# Patient Record
Sex: FEMALE | Race: WHITE | NOT HISPANIC OR LATINO | Employment: UNEMPLOYED | URBAN - METROPOLITAN AREA
[De-identification: names, ages, dates, MRNs, and addresses within clinical notes are randomized per-mention and may not be internally consistent; named-entity substitution may affect disease eponyms.]

---

## 2023-06-28 DIAGNOSIS — O24.919 DIABETES IN PREGNANCY: Primary | ICD-10-CM

## 2023-07-27 ENCOUNTER — LAB VISIT (OUTPATIENT)
Dept: LAB | Facility: HOSPITAL | Age: 33
End: 2023-07-27
Attending: OBSTETRICS & GYNECOLOGY
Payer: MEDICAID

## 2023-07-27 ENCOUNTER — PROCEDURE VISIT (OUTPATIENT)
Dept: MATERNAL FETAL MEDICINE | Facility: CLINIC | Age: 33
End: 2023-07-27
Payer: MEDICAID

## 2023-07-27 ENCOUNTER — OFFICE VISIT (OUTPATIENT)
Dept: MATERNAL FETAL MEDICINE | Facility: CLINIC | Age: 33
End: 2023-07-27
Payer: MEDICAID

## 2023-07-27 VITALS
HEIGHT: 64 IN | HEART RATE: 95 BPM | WEIGHT: 185 LBS | SYSTOLIC BLOOD PRESSURE: 125 MMHG | DIASTOLIC BLOOD PRESSURE: 83 MMHG | BODY MASS INDEX: 31.58 KG/M2

## 2023-07-27 DIAGNOSIS — O24.111 PRE-EXISTING TYPE 2 DIABETES MELLITUS IN PREGNANCY IN FIRST TRIMESTER: ICD-10-CM

## 2023-07-27 DIAGNOSIS — O24.919 DIABETES IN PREGNANCY: ICD-10-CM

## 2023-07-27 DIAGNOSIS — O09.90 AT HIGH RISK FOR COMPLICATIONS OF INTRAUTERINE PREGNANCY (IUP): ICD-10-CM

## 2023-07-27 DIAGNOSIS — O24.111 PRE-EXISTING TYPE 2 DIABETES MELLITUS IN PREGNANCY IN FIRST TRIMESTER: Primary | ICD-10-CM

## 2023-07-27 DIAGNOSIS — O21.0 HYPEREMESIS AFFECTING PREGNANCY, ANTEPARTUM: ICD-10-CM

## 2023-07-27 DIAGNOSIS — O99.212 OBESITY COMPLICATING PREGNANCY IN SECOND TRIMESTER: ICD-10-CM

## 2023-07-27 LAB
EST. AVERAGE GLUCOSE BLD GHB EST-MCNC: 139.9 MG/DL
HBA1C MFR BLD: 6.5 %
TSH SERPL-ACNC: 1.1 UIU/ML (ref 0.35–4.94)

## 2023-07-27 PROCEDURE — 3008F PR BODY MASS INDEX (BMI) DOCUMENTED: ICD-10-PCS | Mod: CPTII,S$GLB,, | Performed by: OBSTETRICS & GYNECOLOGY

## 2023-07-27 PROCEDURE — 3074F SYST BP LT 130 MM HG: CPT | Mod: CPTII,S$GLB,, | Performed by: OBSTETRICS & GYNECOLOGY

## 2023-07-27 PROCEDURE — 76801 PR US, OB <14WKS, TRANSABD, SINGLE GESTATION: ICD-10-PCS | Mod: S$GLB,,, | Performed by: OBSTETRICS & GYNECOLOGY

## 2023-07-27 PROCEDURE — 83036 HEMOGLOBIN GLYCOSYLATED A1C: CPT

## 2023-07-27 PROCEDURE — 99204 PR OFFICE/OUTPT VISIT, NEW, LEVL IV, 45-59 MIN: ICD-10-PCS | Mod: TH,S$GLB,, | Performed by: OBSTETRICS & GYNECOLOGY

## 2023-07-27 PROCEDURE — 1159F PR MEDICATION LIST DOCUMENTED IN MEDICAL RECORD: ICD-10-PCS | Mod: CPTII,S$GLB,, | Performed by: OBSTETRICS & GYNECOLOGY

## 2023-07-27 PROCEDURE — 36415 COLL VENOUS BLD VENIPUNCTURE: CPT

## 2023-07-27 PROCEDURE — 3079F DIAST BP 80-89 MM HG: CPT | Mod: CPTII,S$GLB,, | Performed by: OBSTETRICS & GYNECOLOGY

## 2023-07-27 PROCEDURE — 1159F MED LIST DOCD IN RCRD: CPT | Mod: CPTII,S$GLB,, | Performed by: OBSTETRICS & GYNECOLOGY

## 2023-07-27 PROCEDURE — 84443 ASSAY THYROID STIM HORMONE: CPT

## 2023-07-27 PROCEDURE — 3079F PR MOST RECENT DIASTOLIC BLOOD PRESSURE 80-89 MM HG: ICD-10-PCS | Mod: CPTII,S$GLB,, | Performed by: OBSTETRICS & GYNECOLOGY

## 2023-07-27 PROCEDURE — 76801 OB US < 14 WKS SINGLE FETUS: CPT | Mod: S$GLB,,, | Performed by: OBSTETRICS & GYNECOLOGY

## 2023-07-27 PROCEDURE — 3074F PR MOST RECENT SYSTOLIC BLOOD PRESSURE < 130 MM HG: ICD-10-PCS | Mod: CPTII,S$GLB,, | Performed by: OBSTETRICS & GYNECOLOGY

## 2023-07-27 PROCEDURE — 3008F BODY MASS INDEX DOCD: CPT | Mod: CPTII,S$GLB,, | Performed by: OBSTETRICS & GYNECOLOGY

## 2023-07-27 PROCEDURE — 99204 OFFICE O/P NEW MOD 45 MIN: CPT | Mod: TH,S$GLB,, | Performed by: OBSTETRICS & GYNECOLOGY

## 2023-07-27 RX ORDER — PROMETHAZINE HYDROCHLORIDE 25 MG/1
25 TABLET ORAL EVERY 4 HOURS
Qty: 20 TABLET | Refills: 2 | Status: SHIPPED | OUTPATIENT
Start: 2023-07-27 | End: 2023-09-21

## 2023-07-27 RX ORDER — EMPAGLIFLOZIN 10 MG/1
10 TABLET, FILM COATED ORAL EVERY MORNING
Status: ON HOLD | COMMUNITY
Start: 2023-07-03 | End: 2023-12-26

## 2023-07-27 RX ORDER — ASCORBIC ACID, CHOLECALCIFEROL, DL-.ALPHA.-TOCOPHEROL ACETATE, THIAMINE HYDROCHLORIDE, RIBOFLAVIN, NIACINAMIDE, PYRIDOXINE HYDROCHLORIDE, FOLIC ACID, CYANOCOBALAMIN, CALCIUM CARBONATE, FERROUS FUMARATE, ZINC OXIDE, CUPRIC SULFATE 100; 400; 30; 1.6; 1.6; 20; 3.1; 1; 12; 200; 27; 10; 2 MG/1; [IU]/1; [IU]/1; MG/1; MG/1; MG/1; MG/1; MG/1; UG/1; MG/1; MG/1; MG/1; MG/1
1 TABLET, COATED ORAL
COMMUNITY
Start: 2023-05-31

## 2023-07-27 RX ORDER — METFORMIN HYDROCHLORIDE 1000 MG/1
1000 TABLET ORAL 2 TIMES DAILY
Qty: 60 TABLET | Refills: 0 | Status: SHIPPED | OUTPATIENT
Start: 2023-07-27 | End: 2023-09-07 | Stop reason: SDUPTHER

## 2023-07-27 RX ORDER — METFORMIN HYDROCHLORIDE 1000 MG/1
1000 TABLET ORAL 2 TIMES DAILY
COMMUNITY
Start: 2023-07-03 | End: 2023-07-27 | Stop reason: SDUPTHER

## 2023-07-27 RX ORDER — INSULIN HUMAN 100 [IU]/ML
12 INJECTION, SUSPENSION SUBCUTANEOUS
Qty: 10 ML | Refills: 0 | Status: SHIPPED | OUTPATIENT
Start: 2023-07-27 | End: 2023-07-31 | Stop reason: SDUPTHER

## 2023-07-27 RX ORDER — BLOOD SUGAR DIAGNOSTIC
1 STRIP MISCELLANEOUS 4 TIMES DAILY
Qty: 120 STRIP | Refills: 5 | Status: SHIPPED | OUTPATIENT
Start: 2023-07-27 | End: 2023-08-26

## 2023-07-27 RX ORDER — BLOOD SUGAR DIAGNOSTIC
1 STRIP MISCELLANEOUS 4 TIMES DAILY
COMMUNITY
Start: 2023-06-16 | End: 2023-07-27 | Stop reason: SDUPTHER

## 2023-07-27 RX ORDER — PYRIDOXINE HCL (VITAMIN B6) 25 MG
25 TABLET ORAL 3 TIMES DAILY
Qty: 90 TABLET | Refills: 1 | Status: SHIPPED | OUTPATIENT
Start: 2023-07-27

## 2023-07-27 RX ORDER — LANCETS 30 GAUGE
EACH MISCELLANEOUS
COMMUNITY
Start: 2023-06-05

## 2023-07-27 RX ORDER — PROGESTERONE 200 MG/1
200 CAPSULE ORAL
Status: ON HOLD | COMMUNITY
Start: 2023-07-06 | End: 2023-12-26

## 2023-07-27 RX ORDER — INSULIN LISPRO 100 [IU]/ML
8 INJECTION, SOLUTION INTRAVENOUS; SUBCUTANEOUS
Qty: 2.4 ML | Refills: 0 | Status: SHIPPED | OUTPATIENT
Start: 2023-07-27 | End: 2023-09-29

## 2023-07-27 RX ORDER — LANCETS 33 GAUGE
EACH MISCELLANEOUS
COMMUNITY
Start: 2023-06-05

## 2023-07-27 RX ORDER — DOXYLAMINE SUCCINATE 25 MG/1
25 TABLET ORAL 2 TIMES DAILY
Qty: 60 TABLET | Refills: 0 | Status: SHIPPED | OUTPATIENT
Start: 2023-07-27 | End: 2023-09-21

## 2023-07-27 RX ORDER — INSULIN HUMAN 100 [IU]/ML
INJECTION, SOLUTION PARENTERAL
COMMUNITY
Start: 2023-06-07

## 2023-07-27 NOTE — PROGRESS NOTES
Maternal Fetal Medicine New Consult    Subjective     Patient ID: Ingrid Titus is a 33 y.o. female  at 12w2d gestation with Estimated Date of Delivery: 24  who is here for consultation by M.    Chief Complaint: MFM CONSULT  (AMA/ DIABETIC (ON INSULIN)    PT DID NOT BRING HER GLUCOSE MONITORING FORM FASTING 68-96, POST BREAKFAST , POST LUNCH 110-150, AFTER SUPPER  AND AT BEDTIME .)      Pregnancy complications include:   Patient Active Problem List   Diagnosis    Pre-existing type 2 diabetes mellitus in pregnancy in first trimester    Increased BMI complicating pregnancy in second trimester    At high risk for preeclampsia complications of intrauterine pregnancy (IUP)    Hyperemesis affecting pregnancy, antepartum        HPI 33 y.o.  female  at 12w2d gestation with Estimated Date of Delivery: 24 by LMP, consistent with early US. She is sent for MFM consultation for type 2 diabetes mellitus.  She was diagnosed about 9 years ago.  She stated that the most recent hemoglobin A1c in 2023 was at 7.5%.  She is on metformin 1000 mg with breakfast everyday.  She is also on Jardiance 10 mg daily.  She she is taking regular insulin before meals and a sliding scale.  She had elevated BMI at 31.7 at initial consultation visit.  She reports no recent thyroid test.  She would history of preeclampsia in 1st pregnancy that was severe requiring delivery at 33 weeks' gestation.  She has not done a recent 24 hour urine.  She had an eye exam  She is also on  She denies any personal or family history of aneuploidy or anomalies. She denies any exposure to high fevers, viral rashes, illicit drugs or alcohol in this pregnancy.  She denies any leaking fluid, vaginal bleeding, contractions, decreased fetal movement. Denies headaches, visual disturbances, or epigastric pain    Past Medical History:   Diagnosis Date    Complication of anesthesia 2013    EPIDURAL DID NOT WORK FOR 1ST BABY     Diabetes mellitus     DURING 1ST PREGNANCY UNTIL PRESENT       Past Surgical History:   Procedure Laterality Date     SECTION      2013, AND 2016       Family History   Problem Relation Age of Onset    Hypertension Paternal Grandmother     Miscarriages / Stillbirths Maternal Grandmother     Diabetes Maternal Grandmother     Diabetes Maternal Grandfather     Hypertension Father     Diabetes Father     Stroke Mother     Miscarriages / Stillbirths Mother        Social History     Socioeconomic History    Marital status:    Tobacco Use    Smoking status: Former     Packs/day: 0.50     Years: 4.00     Pack years: 2.00     Types: Cigarettes     Quit date:      Years since quittin.5     Passive exposure: Current    Smokeless tobacco: Never   Substance and Sexual Activity    Alcohol use: Not Currently    Drug use: Never    Sexual activity: Yes     Partners: Male       Current Outpatient Medications   Medication Sig Dispense Refill    HUMULIN R REGULAR U-100 INSULN 100 unit/mL injection CHECK BLOOD SUGARS BEFORE MEALS AND AT BEDTIME: < 130 = 0 UNITS 131-180 = 4 UNITS 181-240 = 8 UNITS 241-300 = 10 UNITS 301-350 = 12 UNITS      JARDIANCE 10 mg tablet Take 10 mg by mouth every morning.      metFORMIN (GLUCOPHAGE) 1000 MG tablet Take 1,000 mg by mouth 2 (two) times daily.      ONETOUCH DELICA PLUS LANCET 33 gauge Misc SMARTSIG:device Topical Twice Daily      ONETOUCH ULTRA TEST Strp MONITOR CBG FOUR TIMES DAILY      ONETOUCH ULTRA2 METER Misc SMARTSIG:device Via Meter Twice Daily      progesterone (PROMETRIUM) 200 MG capsule Take 200 mg by mouth.       27 mg iron- 1 mg Tab Take 1 tablet by mouth.       No current facility-administered medications for this visit.       Review of patient's allergies indicates:  No Known Allergies    Medications:  Current Outpatient Medications   Medication Instructions    HUMULIN R REGULAR U-100 INSULN 100 unit/mL injection CHECK BLOOD SUGARS BEFORE MEALS AND AT  BEDTIME: < 130 = 0 UNITS 131-180 = 4 UNITS 181-240 = 8 UNITS 241-300 = 10 UNITS 301-350 = 12 UNITS    JARDIANCE 10 mg, Oral, Every morning    metFORMIN (GLUCOPHAGE) 1,000 mg, Oral, 2 times daily    ONETOUCH DELICA PLUS LANCET 33 gauge Misc SMARTSIG:device Topical Twice Daily    ONETOUCH ULTRA TEST Strp MONITOR CBG FOUR TIMES DAILY    ONETOUCH ULTRA2 METER Misc SMARTSIG:device Via Meter Twice Daily    progesterone (PROMETRIUM) 200 mg, Oral     27 mg iron- 1 mg Tab 1 tablet, Oral         Review of Systems   Constitutional:  Negative for activity change, chills, fatigue and fever.   HENT:  Negative for nasal congestion.    Eyes:  Negative for visual disturbance.   Respiratory:  Negative for cough, shortness of breath and wheezing.    Cardiovascular:  Negative for chest pain, palpitations and leg swelling.   Gastrointestinal:  Positive for nausea and vomiting. Negative for abdominal pain, constipation, diarrhea and reflux.   Endocrine: Negative for diabetes, hyperthyroidism and hypothyroidism.   Genitourinary:  Negative for bladder incontinence, frequency, hematuria, pelvic pain, urgency, vaginal bleeding, vaginal discharge and vaginal pain.   Musculoskeletal:  Negative for back pain, joint swelling and leg pain.   Integumentary:  Negative for rash.   Neurological:  Negative for seizures and headaches.   Psychiatric/Behavioral:  Negative for depression. The patient is not nervous/anxious.    All other systems reviewed and are negative.        Objective     Vitals:    07/27/23 1005   BP: 125/83   Pulse: 95       Physical Exam  Vitals and nursing note reviewed.   Constitutional:       General: She is not in acute distress.     Appearance: Normal appearance.      Comments: Increased BMI   HENT:      Head: Normocephalic and atraumatic.      Nose: Nose normal. No congestion or epistaxis.      Mouth/Throat:      Pharynx: Oropharynx is clear.   Eyes:      General: No scleral icterus.     Pupils: Pupils are equal, round,  and reactive to light.   Cardiovascular:      Rate and Rhythm: Normal rate and regular rhythm.   Pulmonary:      Effort: No respiratory distress.      Breath sounds: Normal breath sounds. No wheezing.   Abdominal:      General: Abdomen is flat.      Palpations: Abdomen is soft.      Tenderness: There is no abdominal tenderness. There is no right CVA tenderness, left CVA tenderness or guarding.      Comments: No CVA tenderness gravid uterus.    Musculoskeletal:         General: Normal range of motion.      Cervical back: Neck supple.      Right lower leg: No edema.      Left lower leg: No edema.   Skin:     General: Skin is warm.      Findings: No bruising or rash.   Neurological:      General: No focal deficit present.      Mental Status: She is oriented to person, place, and time.      Deep Tendon Reflexes: Reflexes normal.      Comments: Normal reflexes   Psychiatric:         Mood and Affect: Mood normal.         Behavior: Behavior normal.         Thought Content: Thought content normal.         Judgment: Judgment normal.          Assessment and Plan     ASSESSMENT/PLAN:     33 y.o.  female with IUP at 12w2d    Pre-existing type 2 diabetes mellitus in pregnancy in first trimester  I discussed with her risks associated with diabetes in pregnancy including higher risk for polyhydramnios, fetal macrosomia and  metabolic complications (hypoglycemia, hyperbilirubinemia, hypocalcemia, erythema). She was advised of the association of diabetes mellitus with anomalies especially with risk of anomalies correlated with hemoglobin A1C. The higher the hemoglobin A1C, the higher the risk of anomalies. Hemoglobin A1C was ordered with 24-hour urine for creatinine clearance and protein was ordered as baseline test to check for any micro proteinuria. The need for ophthalmology consultation to rule out diabetic retinopathy was discussed and recommended ophthalmologic evaluation for that. Patient will schedule  appointment with Eye doctor when due With risk of congenital heart defect, I discussed the option of fetal echocardiogram to assess cardiac anatomy and will schedule fetal echo around 20 weeks and will schedule level 2 obstetrical ultrasound around 20 weeks. .  Advised to stop the Jardiance    I have shared with her the options of treatment including insulin treatment which is the gold standard of care for diabetes in pregnancy versus a recent use of glyburide or metformin as alternatives. Neither Glyburide nor metformin are indicated for treatment of Type 1 DM. Although, glyburide has been used for around 20 years as primary alternative to insulin, a recent meta-analyis (2015) suggested that metformin should be the alternative to insulin and not glyburide. The conclusion of the study showed a higher birth weight (100 g) associated with glyburide use compared to insulin, two fold higher risk of  hypoglycemia, and more than 2x higher risk of macrosomia associated with glyburide use. This difference is likely to be secondary to hyperinsulinism in fetus secondary to fetal exposure to glyburide. Metformin, also had lower maternal weight gain, lower birth rate and less risk of macrosomia when compared with glyburide. When compared to insulin, Metformin had lower maternal weight gain, increased  birth and lower gestational age at delivery and lower incidence of gestational hypertension. There was a trend for less  hypoglycemia and high failure rate of 30-35%, when compared to insulin. In addition, the lack of long term data on glyburide and metformin use regarding safety was addressed and recommended use of Insulin for treatment, which is the gold standard, with excellent efficiency and safety, albeit more inconvenience. Questions were answered.    I recommend 2200 calorie ADA diet during pregnancy. It is recommended that she have 30 grams of carbohydrates with breakfast, and 60 grams with lunch and  dinner. In addition, she should have three snacks in between meals with 15 grams of carbohydrates and at bedtime with 30 grams of carbohydrates.    Log reviewed. After counseling on Insulin use, side effects and administration, the patient agreed  agree to start outpatient treatment with insulin  12  units of NPH and  8 units of humalog in am, 8 units of Humalog before supper and 8  units of NPH at bedtime. and agreed to continue metformin at 1000 mg with breakfast and. With insulin use/metformin, there is risk for hypoglycemia. I discussed symptoms of hypoglycemia with recommendation to assess blood sugar then eat something high in sugar. I informed her that the best way to decrease episodes of low blood sugar is well balanced healthy diabetic diet with appropriate snacks between meals. Brochures given. Questions answered.    She was advised to keep her blood sugars checks and to call me weekly with her blood sugars. The need for strict blood sugar control with goals of treatment to keep pre-prandial blood sugars between 65 and 90 and 1 hr postprandial blood sugars less than 140 was discussed.    Use asa as discussed.    We will plan to recheck in 2 weeks and call blood sugars next.    She needs to do fetal kick counts throughout the pregnancy starting at 24 weeks.      Increased BMI complicating pregnancy in second trimester  I discussed the risk of miscarriage in first trimester, recurrent miscarriages, congenital anomalies, hypertension, diabetes,  labor and the higher risk of  section and the higher risk of fetal demise in-utero. There is also higher risk of for excessive fetal weight and large for gestational age (LGA) fetuses. Mothers with LGA fetuses are at higher risk of prolonged labor,  delivery, shoulder dystocia and birth trauma. LGA neonates are increased risk of fetal hypoxia and intrauterine death, and are at risk to develop diabetes, obesity, metabolic syndrome, asthma and  cancer later in life. She was advised of the importance of eating healthy and limiting weight gain to 11-20 lbs during the pregnancy, as optimal in this situation. I recommended low calorie, low fat diet avoiding any additional excessive weight gain. Excess weight gain would be associated with gestational hypertension, gestational diabetes and adverse  outcomes, including fetal demise in utero.      It is important to do FKC from 24 weeks till delivery.       Importance of working on losing weight after the pregnancy is over, especially before a future pregnancy was discussed. Breastfeeding may be an important tool in reducing the postpartum weight retention. Fetal risks were discussed with short term risk of fetal/ obesity and long term risk of adolescent component of metabolic syndrome.    follow a healthy low caloric diet and diabetic diet.    At high risk for preeclampsia complications of intrauterine pregnancy (IUP)  With her risk factors for preeclampsia including preeclampsia/GHTN in a previous pregnancy and diabetes (T1DM or T2DM) , BMI over 30, low socioeconomic status, patient born with low birthweight or SGA and previous low birthweight or SGA baby, discussed recommendations for low dose aspirin use to decrease risks for adverse outcomes, including preeclampsia, low birth rate and  delivery. Low-dose aspirin reduced the risk for preeclampsia by 24% in clinical trials and reduced the risk for  birth by 14% and FGR by 20%.  After discussing risks/benefits of its use, it was agreed to start asa 81 mg BID, due to multiple risk factors for preeclampsia. After discussing benefits (more effective than daily) vs potential risks (less data on safety with use at delivery), she agreed to start low dose aspirin twice daily and continue until 34 6/7weeks, then decrease to once daily until delivery.. Also, recommend using in all future pregnancies.      Hyperemesis affecting pregnancy,  antepartum  Discussed with her that nausea and vomiting affects about 50% of pregnancies, and additional 25% have nausea only. The severe form of the condition, hyperemesis gravidarum with significant weight loss, electrolyte abnormalities affect only about 0.3-1% of pregnancies. She does not appear to be dehydrated. I advised her to take vitamin B6 25 mg three times a day, in addition to Doxylamine 25mg at bed time and 12.5 mg in am and 12.5 mg in afternoon. Phenergan 25 mg every six hours prn orally. She was advised on a low-fat bland diet, and eat frequent small meals, avoid juices, and avoid fluids with the meals.  If symptoms worsen, patient was advised to go to the emergency room or labor and delivery.      Follow up in about 2 weeks (around 8/10/2023) for MFM follow-up.             Components of this note were documented using voice recognition systems; and are subject to errors not corrected at proof reading.  Please contact the author for any clarifications.

## 2023-07-27 NOTE — ASSESSMENT & PLAN NOTE
I discussed the risk of miscarriage in first trimester, recurrent miscarriages, congenital anomalies, hypertension, diabetes,  labor and the higher risk of  section and the higher risk of fetal demise in-utero. There is also higher risk of for excessive fetal weight and large for gestational age (LGA) fetuses. Mothers with LGA fetuses are at higher risk of prolonged labor,  delivery, shoulder dystocia and birth trauma. LGA neonates are increased risk of fetal hypoxia and intrauterine death, and are at risk to develop diabetes, obesity, metabolic syndrome, asthma and cancer later in life. She was advised of the importance of eating healthy and limiting weight gain to 11-20 lbs during the pregnancy, as optimal in this situation. I recommended low calorie, low fat diet avoiding any additional excessive weight gain. Excess weight gain would be associated with gestational hypertension, gestational diabetes and adverse  outcomes, including fetal demise in utero.      It is important to do FKC from 24 weeks till delivery.       Importance of working on losing weight after the pregnancy is over, especially before a future pregnancy was discussed. Breastfeeding may be an important tool in reducing the postpartum weight retention. Fetal risks were discussed with short term risk of fetal/ obesity and long term risk of adolescent component of metabolic syndrome.    follow a healthy low caloric diet and diabetic diet.

## 2023-07-27 NOTE — ASSESSMENT & PLAN NOTE
Discussed with her that nausea and vomiting affects about 50% of pregnancies, and additional 25% have nausea only. The severe form of the condition, hyperemesis gravidarum with significant weight loss, electrolyte abnormalities affect only about 0.3-1% of pregnancies. She does not appear to be dehydrated. I advised her to take vitamin B6 25 mg three times a day, in addition to Doxylamine 25mg at bed time and 12.5 mg in am and 12.5 mg in afternoon. Phenergan 25 mg every six hours prn orally. She was advised on a low-fat bland diet, and eat frequent small meals, avoid juices, and avoid fluids with the meals.  If symptoms worsen, patient was advised to go to the emergency room or labor and delivery.

## 2023-07-27 NOTE — ASSESSMENT & PLAN NOTE
I discussed with her risks associated with diabetes in pregnancy including higher risk for polyhydramnios, fetal macrosomia and  metabolic complications (hypoglycemia, hyperbilirubinemia, hypocalcemia, erythema). She was advised of the association of diabetes mellitus with anomalies especially with risk of anomalies correlated with hemoglobin A1C. The higher the hemoglobin A1C, the higher the risk of anomalies. Hemoglobin A1C was ordered with 24-hour urine for creatinine clearance and protein was ordered as baseline test to check for any micro proteinuria. The need for ophthalmology consultation to rule out diabetic retinopathy was discussed and recommended ophthalmologic evaluation for that. Patient will schedule appointment with Eye doctor when due With risk of congenital heart defect, I discussed the option of fetal echocardiogram to assess cardiac anatomy and will schedule fetal echo around 20 weeks and will schedule level 2 obstetrical ultrasound around 20 weeks. .  Advised to stop the Jardiance    I have shared with her the options of treatment including insulin treatment which is the gold standard of care for diabetes in pregnancy versus a recent use of glyburide or metformin as alternatives. Neither Glyburide nor metformin are indicated for treatment of Type 1 DM. Although, glyburide has been used for around 20 years as primary alternative to insulin, a recent meta-analyis (2015) suggested that metformin should be the alternative to insulin and not glyburide. The conclusion of the study showed a higher birth weight (100 g) associated with glyburide use compared to insulin, two fold higher risk of  hypoglycemia, and more than 2x higher risk of macrosomia associated with glyburide use. This difference is likely to be secondary to hyperinsulinism in fetus secondary to fetal exposure to glyburide. Metformin, also had lower maternal weight gain, lower birth rate and less risk of macrosomia when  compared with glyburide. When compared to insulin, Metformin had lower maternal weight gain, increased  birth and lower gestational age at delivery and lower incidence of gestational hypertension. There was a trend for less  hypoglycemia and high failure rate of 30-35%, when compared to insulin. In addition, the lack of long term data on glyburide and metformin use regarding safety was addressed and recommended use of Insulin for treatment, which is the gold standard, with excellent efficiency and safety, albeit more inconvenience. Questions were answered.    I recommend 2200 calorie ADA diet during pregnancy. It is recommended that she have 30 grams of carbohydrates with breakfast, and 60 grams with lunch and dinner. In addition, she should have three snacks in between meals with 15 grams of carbohydrates and at bedtime with 30 grams of carbohydrates.    Log reviewed. After counseling on Insulin use, side effects and administration, the patient agreed  agree to start outpatient treatment with insulin  12  units of NPH and  8 units of humalog in am, 8 units of Humalog before supper and 8  units of NPH at bedtime. and agreed to continue metformin at 1000 mg with breakfast and. With insulin use/metformin, there is risk for hypoglycemia. I discussed symptoms of hypoglycemia with recommendation to assess blood sugar then eat something high in sugar. I informed her that the best way to decrease episodes of low blood sugar is well balanced healthy diabetic diet with appropriate snacks between meals. Brochures given. Questions answered.    She was advised to keep her blood sugars checks and to call me weekly with her blood sugars. The need for strict blood sugar control with goals of treatment to keep pre-prandial blood sugars between 65 and 90 and 1 hr postprandial blood sugars less than 140 was discussed.    Use asa as discussed.    We will plan to recheck in 2 weeks and call blood sugars next.    She  needs to do fetal kick counts throughout the pregnancy starting at 24 weeks.

## 2023-07-27 NOTE — ASSESSMENT & PLAN NOTE
With her risk factors for preeclampsia including preeclampsia/GHTN in a previous pregnancy and diabetes (T1DM or T2DM) , BMI over 30, low socioeconomic status, patient born with low birthweight or SGA and previous low birthweight or SGA baby, discussed recommendations for low dose aspirin use to decrease risks for adverse outcomes, including preeclampsia, low birth rate and  delivery. Low-dose aspirin reduced the risk for preeclampsia by 24% in clinical trials and reduced the risk for  birth by 14% and FGR by 20%.  After discussing risks/benefits of its use, it was agreed to start asa 81 mg BID, due to multiple risk factors for preeclampsia. After discussing benefits (more effective than daily) vs potential risks (less data on safety with use at delivery), she agreed to start low dose aspirin twice daily and continue until 34 6/7weeks, then decrease to once daily until delivery.. Also, recommend using in all future pregnancies.

## 2023-07-31 DIAGNOSIS — O24.111 PRE-EXISTING TYPE 2 DIABETES MELLITUS IN PREGNANCY IN FIRST TRIMESTER: ICD-10-CM

## 2023-07-31 RX ORDER — INSULIN HUMAN 100 [IU]/ML
12 INJECTION, SUSPENSION SUBCUTANEOUS
Qty: 10 ML | Refills: 0 | Status: CANCELLED | OUTPATIENT
Start: 2023-07-31 | End: 2031-10-27

## 2023-07-31 RX ORDER — INSULIN HUMAN 100 [IU]/ML
12 INJECTION, SUSPENSION SUBCUTANEOUS
Qty: 3.6 ML | Refills: 2 | Status: SHIPPED | OUTPATIENT
Start: 2023-07-31 | End: 2023-12-12 | Stop reason: SDUPTHER

## 2023-07-31 NOTE — TELEPHONE ENCOUNTER
----- Message from Ludivina Cooper sent at 7/31/2023  9:47 AM CDT -----  Regarding: Insulin  Patient would like a call back at [phone] because she was supposed to have both insulin sent into her pharmacy last week, and says her pharmacy only received one.

## 2023-08-10 ENCOUNTER — OFFICE VISIT (OUTPATIENT)
Dept: MATERNAL FETAL MEDICINE | Facility: CLINIC | Age: 33
End: 2023-08-10
Payer: MEDICAID

## 2023-08-10 VITALS
SYSTOLIC BLOOD PRESSURE: 123 MMHG | HEIGHT: 64 IN | BODY MASS INDEX: 31.76 KG/M2 | HEART RATE: 91 BPM | WEIGHT: 186 LBS | DIASTOLIC BLOOD PRESSURE: 79 MMHG

## 2023-08-10 DIAGNOSIS — O99.212 OBESITY COMPLICATING PREGNANCY IN SECOND TRIMESTER: ICD-10-CM

## 2023-08-10 DIAGNOSIS — O24.112 PRE-EXISTING TYPE 2 DIABETES MELLITUS IN PREGNANCY IN SECOND TRIMESTER: Primary | ICD-10-CM

## 2023-08-10 DIAGNOSIS — O09.90 AT HIGH RISK FOR COMPLICATIONS OF INTRAUTERINE PREGNANCY (IUP): ICD-10-CM

## 2023-08-10 PROBLEM — O21.0 HYPEREMESIS AFFECTING PREGNANCY, ANTEPARTUM: Status: RESOLVED | Noted: 2023-07-27 | Resolved: 2023-08-10

## 2023-08-10 PROCEDURE — 3008F BODY MASS INDEX DOCD: CPT | Mod: CPTII,S$GLB,, | Performed by: OBSTETRICS & GYNECOLOGY

## 2023-08-10 PROCEDURE — 3044F PR MOST RECENT HEMOGLOBIN A1C LEVEL <7.0%: ICD-10-PCS | Mod: CPTII,S$GLB,, | Performed by: OBSTETRICS & GYNECOLOGY

## 2023-08-10 PROCEDURE — 3074F PR MOST RECENT SYSTOLIC BLOOD PRESSURE < 130 MM HG: ICD-10-PCS | Mod: CPTII,S$GLB,, | Performed by: OBSTETRICS & GYNECOLOGY

## 2023-08-10 PROCEDURE — 99214 OFFICE O/P EST MOD 30 MIN: CPT | Mod: TH,S$GLB,, | Performed by: OBSTETRICS & GYNECOLOGY

## 2023-08-10 PROCEDURE — 99214 PR OFFICE/OUTPT VISIT, EST, LEVL IV, 30-39 MIN: ICD-10-PCS | Mod: TH,S$GLB,, | Performed by: OBSTETRICS & GYNECOLOGY

## 2023-08-10 PROCEDURE — 3074F SYST BP LT 130 MM HG: CPT | Mod: CPTII,S$GLB,, | Performed by: OBSTETRICS & GYNECOLOGY

## 2023-08-10 PROCEDURE — 3044F HG A1C LEVEL LT 7.0%: CPT | Mod: CPTII,S$GLB,, | Performed by: OBSTETRICS & GYNECOLOGY

## 2023-08-10 PROCEDURE — 3008F PR BODY MASS INDEX (BMI) DOCUMENTED: ICD-10-PCS | Mod: CPTII,S$GLB,, | Performed by: OBSTETRICS & GYNECOLOGY

## 2023-08-10 PROCEDURE — 1159F PR MEDICATION LIST DOCUMENTED IN MEDICAL RECORD: ICD-10-PCS | Mod: CPTII,S$GLB,, | Performed by: OBSTETRICS & GYNECOLOGY

## 2023-08-10 PROCEDURE — 3078F DIAST BP <80 MM HG: CPT | Mod: CPTII,S$GLB,, | Performed by: OBSTETRICS & GYNECOLOGY

## 2023-08-10 PROCEDURE — 1159F MED LIST DOCD IN RCRD: CPT | Mod: CPTII,S$GLB,, | Performed by: OBSTETRICS & GYNECOLOGY

## 2023-08-10 PROCEDURE — 3078F PR MOST RECENT DIASTOLIC BLOOD PRESSURE < 80 MM HG: ICD-10-PCS | Mod: CPTII,S$GLB,, | Performed by: OBSTETRICS & GYNECOLOGY

## 2023-08-10 RX ORDER — ASPIRIN 81 MG/1
81 TABLET ORAL 2 TIMES DAILY
Refills: 0 | COMMUNITY
Start: 2023-08-10 | End: 2024-05-16

## 2023-08-10 NOTE — PROGRESS NOTES
Maternal Fetal Medicine follow up consult    Subjective     Patient ID: Ingrid Titus is a 33 y.o. female  at 14w2d gestation with Estimated Date of Delivery: 24  who is here for follow  up consultation by [facility].    Chief Complaint: MFM FOLLOW UP       Pregnancy complications include:   Patient Active Problem List   Diagnosis    Pre-existing type 2 diabetes mellitus in pregnancy in second trimester    Increased BMI complicating pregnancy in second trimester    At high risk for preeclampsia complications of intrauterine pregnancy (IUP)        No changes to medical, surgical, family, social, or obstetric history.       type 2 diabetes mellitus.  She was diagnosed about 9 years ago.  She stated that the most recent hemoglobin A1c in 2023 was at 7.5%.  She is on metformin 1000 mg with breakfast and supper.  She is on insulin combination of NPH and Humalog.  She had elevated BMI at 31.7 at initial consultation visit.  TSH was normal on  at 1.1 with a hemoglobin A1c down to 6.5.  Patient's nausea vomiting has stopped.  Patient is accompanied by her mother-in-law Lisa           Interval history since last M visit: None.. She denies any leaking fluid, vaginal bleeding, contractions, decreased fetal movement. Denies headaches, visual disturbances, or epigastric pain    Medications:  Current Outpatient Medications   Medication Instructions    aspirin (ECOTRIN) 81 mg, Oral, 2 times daily    blood sugar diagnostic Strp 1 strip, Misc.(Non-Drug; Combo Route), 4 times daily    HumuLIN N NPH U-100 Insulin 12 Units, Subcutaneous, Before breakfast, Inject 12 units in the am and 8 units at night.    HUMULIN R REGULAR U-100 INSULN 100 unit/mL injection CHECK BLOOD SUGARS BEFORE MEALS AND AT BEDTIME: < 130 = 0 UNITS 131-180 = 4 UNITS 181-240 = 8 UNITS 241-300 = 10 UNITS 301-350 = 12 UNITS    insulin lispro (HUMALOG U-100 INSULIN) 8 Units, Subcutaneous, Before breakfast    insulin syringes, disposable,  1 mL Syrg 1 Syringe, Misc.(Non-Drug; Combo Route), 3 times daily    JARDIANCE 10 mg, Oral, Every morning    metFORMIN (GLUCOPHAGE) 1,000 mg, Oral, 2 times daily    ONETOUCH DELICA PLUS LANCET 33 gauge Misc SMARTSIG:device Topical Twice Daily    ONETOUCH ULTRA TEST Strp 1 strip, Misc.(Non-Drug; Combo Route), 4 times daily    ONETOUCH ULTRA2 METER Misc SMARTSIG:device Via Meter Twice Daily    progesterone (PROMETRIUM) 200 mg, Oral    promethazine (PHENERGAN) 25 mg, Oral, Every 4 hours    pyridoxine (vitamin B6) (B-6) 25 mg, Oral, 3 times daily     27 mg iron- 1 mg Tab 1 tablet, Oral    UNISOM (DOXYLAMINE) 25 mg, Oral, 2 times daily       Review of Systems   Constitutional:  Negative for activity change.   Eyes:  Negative for visual disturbance.   Respiratory: Negative.     Cardiovascular:  Negative for leg swelling.   Gastrointestinal:  Negative for abdominal pain, constipation, diarrhea, nausea, vomiting and reflux.   Genitourinary:  Negative for bladder incontinence, frequency, pelvic pain, vaginal bleeding and vaginal discharge.        Good fetal movements   Musculoskeletal:  Negative for back pain.   Neurological:  Negative for headaches.   All other systems reviewed and are negative.          Objective     Vitals:    08/10/23 0949   BP: 123/79   Pulse: 91        Physical Exam       Assessment and Plan     ASSESSMENT/PLAN:     33 y.o.  female with IUP at 14w2d       Pre-existing type 2 diabetes mellitus in pregnancy in first trimester      Follow Risks associated with diabetes in pregnancy include higher risk for polyhydramnios, fetal macrosomia and  metabolic complications (hypoglycemia, hyperbilirubinemia, hypocalcemia, erythema).     Continue 2200 calorie ADA diet during pregnancy. It is recommended that she have 30 grams of carbohydrates with breakfast, and 60 grams with lunch and dinner. In addition, she should have three snacks in between meals with 15 grams of carbohydrates and at  bedtime with 30 grams of carbohydrates.    Log reviewed. Patient advised to continue metformin 1000 mg twice a day and adjust insulin to 14 units of NPH in the morning and 8 units of Humalog in the morning 10 units of Humalog for supper and 8 units of NPH at bedtime.  Also added corrective dose of Humalog for 1 unit for every 8 mg of sugar over 140.    She was advised to keep her blood sugars checks and to call me weekly with her blood sugars. The need for strict blood sugar control with goals of treatment to keep pre-prandial blood sugars between 65 and 90 and 1 hr postprandial blood sugars less than 140 was reviewed.     With her multiple risk factors for preeclampsia, she agreed to continue asa 81 mg BID until 34 6/7 weeks, then decrease to once daily until delivery..    Will plan to check fetal anatomy around 20 weeks. Fetal testing  could be started in this setting around 30 weeks gestation .She needs to do fetal kick counts throughout the pregnancy starting at 24 weeks.      Increased BMI complicating pregnancy in second trimester    With Body mass index is 31.93 kg/m²., and stable weight from last visit, she was advised to follow a healthy low caloric diet and diabetic diet.  Excess weight gain would be associated with gestational hypertension, gestational diabetes and adverse  outcomes, including fetal demise in utero.    With risk factors associated with increased BMI,  will start fetal kick counts at 24 weeks and continue till delivery, and continue asa 81 mg BID until 34 6/7 weeks, then decrease to once daily until delivery.    It is important to lose weight after the pregnancy is over, especially before a future pregnancy was discussed. Breastfeeding may be an important tool in reducing the postpartum weight retention. Fetal risks were discussed with short term risk of fetal/ obesity and long term risk of adolescent component of metabolic syndrome.      At high risk for preeclampsia  complications of intrauterine pregnancy (IUP)  Continue aspirin b.i.d..  Preeclampsia prevention.  Decrease at 35 weeks to daily unless preeclampsia develops.      No follow-ups on file.             Components of this note were documented using voice recognition systems; and are subject to errors not corrected at proof reading.  Please contact the author for any clarifications.

## 2023-08-14 ENCOUNTER — PATIENT MESSAGE (OUTPATIENT)
Dept: MATERNAL FETAL MEDICINE | Facility: CLINIC | Age: 33
End: 2023-08-14
Payer: MEDICAID

## 2023-08-22 ENCOUNTER — PATIENT MESSAGE (OUTPATIENT)
Dept: MATERNAL FETAL MEDICINE | Facility: CLINIC | Age: 33
End: 2023-08-22
Payer: MEDICAID

## 2023-08-30 DIAGNOSIS — O99.212 OBESITY COMPLICATING PREGNANCY IN SECOND TRIMESTER: ICD-10-CM

## 2023-08-30 DIAGNOSIS — O24.112 PRE-EXISTING TYPE 2 DIABETES MELLITUS IN PREGNANCY IN SECOND TRIMESTER: Primary | ICD-10-CM

## 2023-09-05 ENCOUNTER — PATIENT MESSAGE (OUTPATIENT)
Dept: MATERNAL FETAL MEDICINE | Facility: CLINIC | Age: 33
End: 2023-09-05
Payer: MEDICAID

## 2023-09-07 DIAGNOSIS — O24.111 PRE-EXISTING TYPE 2 DIABETES MELLITUS IN PREGNANCY IN FIRST TRIMESTER: ICD-10-CM

## 2023-09-07 RX ORDER — METFORMIN HYDROCHLORIDE 1000 MG/1
1000 TABLET ORAL 2 TIMES DAILY
Qty: 60 TABLET | Refills: 0 | Status: SHIPPED | OUTPATIENT
Start: 2023-09-07 | End: 2023-10-19 | Stop reason: SDUPTHER

## 2023-09-18 ENCOUNTER — LAB VISIT (OUTPATIENT)
Dept: LAB | Facility: HOSPITAL | Age: 33
End: 2023-09-18
Attending: OBSTETRICS & GYNECOLOGY
Payer: MEDICAID

## 2023-09-18 DIAGNOSIS — Z34.82 PRENATAL CARE, SUBSEQUENT PREGNANCY IN SECOND TRIMESTER: Primary | ICD-10-CM

## 2023-09-18 LAB
ERYTHROCYTE [DISTWIDTH] IN BLOOD BY AUTOMATED COUNT: 12.7 % (ref 11.5–17)
GROUP & RH: NORMAL
HBV SURFACE AG SERPL QL IA: NONREACTIVE
HCT VFR BLD AUTO: 35.6 % (ref 37–47)
HCV AB SERPL QL IA: NONREACTIVE
HGB BLD-MCNC: 11.8 G/DL (ref 12–16)
HIV 1+2 AB+HIV1 P24 AG SERPL QL IA: NONREACTIVE
INDIRECT COOMBS GEL: NORMAL
MCH RBC QN AUTO: 29.4 PG (ref 27–31)
MCHC RBC AUTO-ENTMCNC: 33.1 G/DL (ref 33–36)
MCV RBC AUTO: 88.6 FL (ref 80–94)
NRBC BLD AUTO-RTO: 0 %
PLATELET # BLD AUTO: 233 X10(3)/MCL (ref 130–400)
PMV BLD AUTO: 10.9 FL (ref 7.4–10.4)
RBC # BLD AUTO: 4.02 X10(6)/MCL (ref 4.2–5.4)
SPECIMEN OUTDATE: NORMAL
T PALLIDUM AB SER QL: NONREACTIVE
TSH SERPL-ACNC: 2.82 UIU/ML (ref 0.35–4.94)
WBC # SPEC AUTO: 9.42 X10(3)/MCL (ref 4.5–11.5)

## 2023-09-18 PROCEDURE — 81222 CFTR GENE DUP/DELET VARIANTS: CPT | Mod: 59

## 2023-09-18 PROCEDURE — 86762 RUBELLA ANTIBODY: CPT

## 2023-09-18 PROCEDURE — 86803 HEPATITIS C AB TEST: CPT

## 2023-09-18 PROCEDURE — 85027 COMPLETE CBC AUTOMATED: CPT

## 2023-09-18 PROCEDURE — 81511 FTL CGEN ABNOR FOUR ANAL: CPT

## 2023-09-18 PROCEDURE — 87340 HEPATITIS B SURFACE AG IA: CPT

## 2023-09-18 PROCEDURE — 86780 TREPONEMA PALLIDUM: CPT

## 2023-09-18 PROCEDURE — 84443 ASSAY THYROID STIM HORMONE: CPT

## 2023-09-18 PROCEDURE — 87088 URINE BACTERIA CULTURE: CPT

## 2023-09-18 PROCEDURE — 87389 HIV-1 AG W/HIV-1&-2 AB AG IA: CPT

## 2023-09-18 PROCEDURE — 36415 COLL VENOUS BLD VENIPUNCTURE: CPT

## 2023-09-18 PROCEDURE — 85660 RBC SICKLE CELL TEST: CPT

## 2023-09-18 PROCEDURE — 86900 BLOOD TYPING SEROLOGIC ABO: CPT | Performed by: OBSTETRICS & GYNECOLOGY

## 2023-09-19 LAB
# FETUSES: 1
2ND TRIMESTER 4 SCREEN SERPL-IMP: NORMAL
AFP ADJ MOM SERPL: 0.89 MOM
AFP SERPL IA-MCNC: 46.9 NG/ML
AGE AT DELIVERY: NORMAL
B-HCG ADJ MOM SERPL: 1.17 MOM
CHORION TYPE: NORMAL
COLLECT DATE: NORMAL
CURRENT SMOKER: NORMAL
FET TS 21 RISK FROM MAT AGE: NORMAL
GA METHOD: NORMAL
GA US.COMPOSITE.EST: NORMAL WK,D
HCG SERPL IA-ACNC: 21.3 IU/ML
HGB S BLD QL SOLY: NEGATIVE
HX OF NTD QL: NO
HX OF NTD QL: NO
HX OF TRISOMY 21 QL: NO
IDDM PATIENT QL: NO
INHIBIN A ADJ MOM SERPL: 0.98 MOM
INHIBIN SERPL-MCNC: 151 PG/ML
IVF PREGNANCY: NO
LABORATORY COMMENT REPORT: NORMAL
M PHYSICIAN PHONE NUMBER: NORMAL
MATERNAL RISK FACTORS: NORMAL
NEURAL TUBE DEFECT RISK FETUS: NORMAL %
RECOM F/U: NORMAL
RUBV IGG SERPL IA-ACNC: 0.8
RUBV IGG SERPL QL IA: NORMAL
TEST PERFORMANCE INFO SPEC: NORMAL
TS 18 RISK FETUS: NORMAL
TS 21 RISK FETUS: NORMAL
U ESTRIOL ADJ MOM SERPL: 1.05 MOM
U ESTRIOL SERPL-MCNC: 1.95 NG/ML

## 2023-09-20 ENCOUNTER — PATIENT MESSAGE (OUTPATIENT)
Dept: MATERNAL FETAL MEDICINE | Facility: CLINIC | Age: 33
End: 2023-09-20
Payer: MEDICAID

## 2023-09-20 LAB — BACTERIA UR CULT: NORMAL

## 2023-09-21 ENCOUNTER — PROCEDURE VISIT (OUTPATIENT)
Dept: MATERNAL FETAL MEDICINE | Facility: CLINIC | Age: 33
End: 2023-09-21
Payer: MEDICAID

## 2023-09-21 ENCOUNTER — OFFICE VISIT (OUTPATIENT)
Dept: MATERNAL FETAL MEDICINE | Facility: CLINIC | Age: 33
End: 2023-09-21
Payer: MEDICAID

## 2023-09-21 VITALS
BODY MASS INDEX: 32.5 KG/M2 | HEART RATE: 106 BPM | HEIGHT: 64 IN | DIASTOLIC BLOOD PRESSURE: 76 MMHG | WEIGHT: 190.38 LBS | SYSTOLIC BLOOD PRESSURE: 115 MMHG

## 2023-09-21 DIAGNOSIS — O99.212 OBESITY COMPLICATING PREGNANCY IN SECOND TRIMESTER: ICD-10-CM

## 2023-09-21 DIAGNOSIS — O09.90 AT HIGH RISK FOR COMPLICATIONS OF INTRAUTERINE PREGNANCY (IUP): ICD-10-CM

## 2023-09-21 DIAGNOSIS — O24.112 PRE-EXISTING TYPE 2 DIABETES MELLITUS IN PREGNANCY IN SECOND TRIMESTER: Primary | ICD-10-CM

## 2023-09-21 DIAGNOSIS — O24.112 PRE-EXISTING TYPE 2 DIABETES MELLITUS IN PREGNANCY IN SECOND TRIMESTER: ICD-10-CM

## 2023-09-21 DIAGNOSIS — O09.522 SUPERVISION OF ELDERLY MULTIGRAVIDA IN SECOND TRIMESTER: ICD-10-CM

## 2023-09-21 PROCEDURE — 3078F PR MOST RECENT DIASTOLIC BLOOD PRESSURE < 80 MM HG: ICD-10-PCS | Mod: CPTII,S$GLB,, | Performed by: OBSTETRICS & GYNECOLOGY

## 2023-09-21 PROCEDURE — 3044F PR MOST RECENT HEMOGLOBIN A1C LEVEL <7.0%: ICD-10-PCS | Mod: CPTII,S$GLB,, | Performed by: OBSTETRICS & GYNECOLOGY

## 2023-09-21 PROCEDURE — 99214 PR OFFICE/OUTPT VISIT, EST, LEVL IV, 30-39 MIN: ICD-10-PCS | Mod: TH,25,S$GLB, | Performed by: OBSTETRICS & GYNECOLOGY

## 2023-09-21 PROCEDURE — 1159F MED LIST DOCD IN RCRD: CPT | Mod: CPTII,S$GLB,, | Performed by: OBSTETRICS & GYNECOLOGY

## 2023-09-21 PROCEDURE — 3008F PR BODY MASS INDEX (BMI) DOCUMENTED: ICD-10-PCS | Mod: CPTII,S$GLB,, | Performed by: OBSTETRICS & GYNECOLOGY

## 2023-09-21 PROCEDURE — 99214 OFFICE O/P EST MOD 30 MIN: CPT | Mod: TH,25,S$GLB, | Performed by: OBSTETRICS & GYNECOLOGY

## 2023-09-21 PROCEDURE — 3008F BODY MASS INDEX DOCD: CPT | Mod: CPTII,S$GLB,, | Performed by: OBSTETRICS & GYNECOLOGY

## 2023-09-21 PROCEDURE — 3078F DIAST BP <80 MM HG: CPT | Mod: CPTII,S$GLB,, | Performed by: OBSTETRICS & GYNECOLOGY

## 2023-09-21 PROCEDURE — 1159F PR MEDICATION LIST DOCUMENTED IN MEDICAL RECORD: ICD-10-PCS | Mod: CPTII,S$GLB,, | Performed by: OBSTETRICS & GYNECOLOGY

## 2023-09-21 PROCEDURE — 3044F HG A1C LEVEL LT 7.0%: CPT | Mod: CPTII,S$GLB,, | Performed by: OBSTETRICS & GYNECOLOGY

## 2023-09-21 PROCEDURE — 3074F SYST BP LT 130 MM HG: CPT | Mod: CPTII,S$GLB,, | Performed by: OBSTETRICS & GYNECOLOGY

## 2023-09-21 PROCEDURE — 3074F PR MOST RECENT SYSTOLIC BLOOD PRESSURE < 130 MM HG: ICD-10-PCS | Mod: CPTII,S$GLB,, | Performed by: OBSTETRICS & GYNECOLOGY

## 2023-09-21 PROCEDURE — 76811 PR US, OB FETAL EVAL & EXAM, TRANSABDOM,FIRST GESTATION: ICD-10-PCS | Mod: S$GLB,,, | Performed by: OBSTETRICS & GYNECOLOGY

## 2023-09-21 PROCEDURE — 76811 OB US DETAILED SNGL FETUS: CPT | Mod: S$GLB,,, | Performed by: OBSTETRICS & GYNECOLOGY

## 2023-09-21 NOTE — PROGRESS NOTES
Maternal Fetal Medicine Follow Up Consult    Subjective     Patient ID: [id number]    Chief Complaint: M FOLLOWUP W/US      HPI: Ingrid Titus is a 33 y.o. female  at 20w2d gestation with Estimated Date of Delivery: 24  who is here for follow  up consultation by [facility].  She has type 2 diabetes mellitus.  She was diagnosed about 9 years ago.  She stated that the most recent hemoglobin A1c in 2023 was at 7.5%.  She is on metformin 1000 mg with breakfast and supper.  She is on insulin combination of NPH and Humalog.  She had elevated BMI at 31.7 at initial consultation visit.  TSH was normal on  at 1.1 with a hemoglobin A1c down to 6.5%.  Patient's nausea and vomiting has resolved.  Patient has advanced maternal age and is going to be 33 by the due date.  She would a quad screen that was negative with a risk of Down syndrome at 1 in 2500.         Interval history since last M visit: None.. She denies any leaking fluid, vaginal bleeding, contractions, decreased fetal movement. Denies headaches, visual disturbances, or epigastric pain    Pregnancy complications include:   Patient Active Problem List   Diagnosis    Pre-existing type 2 diabetes mellitus in pregnancy in second trimester    Increased BMI complicating pregnancy in second trimester    At high risk for preeclampsia complications of intrauterine pregnancy (IUP)    Supervision of elderly multigravida in second trimester        No changes to medical, surgical, family, social, or obstetric history.    Medications:  Current Outpatient Medications   Medication Instructions    aspirin (ECOTRIN) 81 mg, Oral, 2 times daily    blood sugar diagnostic Strp 1 strip, Misc.(Non-Drug; Combo Route), 4 times daily    HumuLIN N NPH U-100 Insulin 12 Units, Subcutaneous, Before breakfast, Inject 12 units in the am and 8 units at night.    HUMULIN R REGULAR U-100 INSULN 100 unit/mL injection CHECK BLOOD SUGARS BEFORE MEALS AND AT BEDTIME: < 130 = 0  "UNITS 131-180 = 4 UNITS 181-240 = 8 UNITS 241-300 = 10 UNITS 301-350 = 12 UNITS    insulin lispro (HUMALOG U-100 INSULIN) 8 Units, Subcutaneous, Before breakfast    JARDIANCE 10 mg, Oral, Every morning    metFORMIN (GLUCOPHAGE) 1,000 mg, Oral, 2 times daily    ONETOUCH DELICA PLUS LANCET 33 gauge Misc SMARTSIG:device Topical Twice Daily    ONETOUCH ULTRA2 METER Misc SMARTSIG:device Via Meter Twice Daily    progesterone (PROMETRIUM) 200 mg, Oral    pyridoxine (vitamin B6) (B-6) 25 mg, Oral, 3 times daily     27 mg iron- 1 mg Tab 1 tablet, Oral       Review of Systems   12 point review of systems conducted, negative except as stated in the history of present illness. See HPI for details.      Objective     Visit Vitals  /76 (BP Location: Left arm, Patient Position: Sitting, BP Method: Large (Automatic))   Pulse 106   Ht 5' 4" (1.626 m)   Wt 86.4 kg (190 lb 6.4 oz)   LMP 2023 (Exact Date)   BMI 32.68 kg/m²        Physical Exam  Vitals and nursing note reviewed.   Constitutional:       Appearance: Normal appearance.      Comments: Increased BMI   HENT:      Head: Normocephalic and atraumatic.      Nose: Nose normal. No congestion.   Cardiovascular:      Rate and Rhythm: Normal rate.   Pulmonary:      Effort: Pulmonary effort is normal.   Skin:     Findings: No rash.   Neurological:      Mental Status: She is alert and oriented to person, place, and time.   Psychiatric:         Mood and Affect: Mood normal.         Behavior: Behavior normal.         Thought Content: Thought content normal.         Judgment: Judgment normal.           ASSESSMENT/PLAN:     33 y.o.  female with IUP at 20w2d    Pre-existing type 2 diabetes mellitus in pregnancy in 2nd trimester  Risks associated with diabetes in pregnancy include higher risk for polyhydramnios, fetal macrosomia and  metabolic complications (hypoglycemia, hyperbilirubinemia, hypocalcemia, erythema).     Continue 2200 calorie ADA diet during " pregnancy. It is recommended that she have 30 grams of carbohydrates with breakfast, and 60 grams with lunch and dinner. In addition, she should have three snacks in between meals with 15 grams of carbohydrates and at bedtime with 30 grams of carbohydrates.    Log reviewed. Patient advised to continue metformin 1000 mg BID and insulin 12 units of NPH in the morning and 8 units of Humalog in the morning 8 units of Humalog for supper and 8 units of NPH at bedtime.  Also added corrective dose of Humalog for 1 unit for every 8 mg of sugar over 140.    She was advised to keep her blood sugars checks and to call me weekly with her blood sugars. The need for strict blood sugar control with goals of treatment to keep pre-prandial blood sugars between 65 and 90 and 1 hr postprandial blood sugars less than 140 was reviewed.     Low-dose aspirin as discussed.    Will plan to recheck fetal growth in 4 weeks. Fetal testing could be started in this setting around 30 weeks gestation. She needs to do fetal kick counts throughout the pregnancy starting at 24 weeks.      With risk for congenital heart disease with type 2 diabetes, discussed option of fetal echo, which she accepted.  Referral to pediatric cardiology sent.       Increased BMI complicating pregnancy in second trimester  Body mass index is 32.68 kg/m².and mild excessive weight gain from last visit, 4 lb in 1 month.  She was reminded to follow a healthy low caloric diet and diabetic diet.  Excess weight gain would be associated with gestational hypertension, gestational diabetes and adverse  outcomes, including fetal demise in utero.    With risk factors associated with increased BMI, will start fetal kick counts at 24 weeks and continue until delivery.    It is important to lose weight after the pregnancy is over, especially before a future pregnancy was discussed. Breastfeeding may be an important tool in reducing the postpartum weight retention. Fetal risks  were discussed with short term risk of fetal/ obesity and long term risk of adolescent component of metabolic syndrome.        At high risk for preeclampsia complications of intrauterine pregnancy (IUP)  With her increased risk for preeclampsia, she agreed to continue asa 81 mg BID until 34 6/7 weeks, then decrease to once daily until delivery.. Preeclampsia precautions reviewed.    Advanced maternal age at 33 by [facility]      I discussed with her that the higher risk of aneuploidy related to advanced maternal age is caused by meiotic nondysjunction.  At this maternal  age, the risk of Down syndrome quoted at 1:  353 and the risk of any chromosomal problems quoted at 1:  178 .  she would not consider termination of pregnancy even if anomalies or aneuploidy is detected .. She was advised of the screening nature of the Level 2 ultrasound as well as the screening nature of a quad screen to check for the risk of aneuploidy with normal ultrasound and or quad screen testing that would lower the background risk of aneuploidy.        She was advised that the only diagnostic test at this time is genetic amniocentesis.  Benefits and risks of a genetic amniocentesis were discussed. Patient was offered genetic amniocentesis, after thorough counseling on benefits and risks of procedure, with very remote risk of complications and in some studies no identifiable increased risk, above background risk of spontaneous miscarriage, while some current data suggests risk up to 1 in 800 with early amniocentesis prior to 24 weeks, and remote risk of need for emergency delivery with all the complications of prematurity with amniocentesis after 24 weeks. She declined amniocentesis . She is aware of the need for  evaluation.    She was counseled on Cell free DNA understanding that it is an enhanced screening test but not a diagnostic test. It assesses risk for common aneuploidies such as Trisomy 13, 18, and 21 by evaluating  cell-free fetal DNA in maternal circulation with a false positive rate less than 0.5% for Trisomy 21 and less reliable for Trisomy 13 and Trisomy 18. She already did quad screen that was negative at 1 in 2500 and declined to have a cell free DNA.    The higher risk of anomalies associated with advanced maternal age was also addressed. The reassurance obtained by the normal ultrasound and the limitations of ultrasound in checking for anomalies was explained with the need for regular  evaluations.     I recommend start fetal kick counts at 24 weeks. .      Follow up in about 4 weeks (around 10/19/2023) for MFM follow-up, Repeat ultrasound, BPP, Call sugars weekly.     No future appointments.       JENNIFER involvement: Patient was evaluated by KARELE Farr and Dr. Mac.  Final assessment and recommendations as stated above were made by Dr. Mac.    Blood sugars are well controlled.  I told her she could cut down on the checking to 2 times a day at different times of the day with calling her sugars every week.  She has had reasonable weight gain of about 5 lb in about 2 months.  Continue healthy diet.  Her hemoglobin A1c has improved.  We will repeat 1 in about a month from now.  Order 24 hour urine and continue twice daily aspirin.  Counseled on advanced maternal age as above.    Components of this note were documented using voice recognition systems; and are subject to errors not corrected at proof reading.  Please contact the author for any clarifications.

## 2023-09-28 LAB
ANNOTATION COMMENT IMP: NORMAL
GENETIC VARIANT DETAILS BLD/T: NORMAL
GENETICIST REVIEW: NORMAL
LAB TEST METHOD: NORMAL
MOL DX INTERP BLD/T QL: NORMAL
PROVIDER SIGNING NAME: NORMAL
SPECIMEN SOURCE: NORMAL

## 2023-09-29 DIAGNOSIS — O24.111 PRE-EXISTING TYPE 2 DIABETES MELLITUS IN PREGNANCY IN FIRST TRIMESTER: ICD-10-CM

## 2023-09-29 RX ORDER — INSULIN LISPRO 100 [IU]/ML
INJECTION, SOLUTION INTRAVENOUS; SUBCUTANEOUS
Qty: 10 ML | Refills: 2 | Status: SHIPPED | OUTPATIENT
Start: 2023-09-29

## 2023-10-10 ENCOUNTER — PATIENT MESSAGE (OUTPATIENT)
Dept: MATERNAL FETAL MEDICINE | Facility: CLINIC | Age: 33
End: 2023-10-10
Payer: MEDICAID

## 2023-10-17 DIAGNOSIS — O24.112 PRE-EXISTING TYPE 2 DIABETES MELLITUS IN PREGNANCY IN SECOND TRIMESTER: Primary | ICD-10-CM

## 2023-10-18 NOTE — PROGRESS NOTES
Maternal Fetal Medicine Follow Up Consult    Subjective     Patient ID: [id number]    Chief Complaint: MFM follow up with US (Type 2 Diabetes.  Patient did not bring sugar log  Fasting 71- 82, Post breakfast 115-122, Post lunch 120-130 and Post supper 122-134.)      HPI: Ingrid Titus is a 33 y.o. female  at 24w2d gestation with Estimated Date of Delivery: 24  who is here for follow  up consultation by M.  She has type 2 diabetes mellitus.  She was diagnosed about 9 years ago.  She stated that the most recent hemoglobin A1c in 2023 was at 7.5%.  She is on metformin 1000 mg with breakfast and supper.  She is on insulin combination of NPH and Humalog.  She had elevated BMI at 31.7 at initial consultation visit.  TSH was normal on  at 1.1 and hemoglobin A1c was down to 6.5%.  Patient's nausea and vomiting has resolved.  Patient has advanced maternal age and is going to be 33 by the due date.  She had a quad screen that was negative with a risk of Down syndrome at 1 in 2500.  She complains of itching to her legs that has increased in intensity in the last few weeks.  She denies itching anywhere else.  She has tried anti-itch creams without relief.  There are no rashes.         Interval history since last MFM visit: None.. She denies any leaking fluid, vaginal bleeding, contractions, decreased fetal movement. Denies headaches, visual disturbances, or epigastric pain    Pregnancy complications include:   Patient Active Problem List   Diagnosis    Pre-existing type 2 diabetes mellitus in pregnancy in second trimester    Increased BMI complicating pregnancy in second trimester    At high risk for preeclampsia complications of intrauterine pregnancy (IUP)    Supervision of elderly multigravida in second trimester        No changes to medical, surgical, family, social, or obstetric history.    Medications:  Current Outpatient Medications   Medication Instructions    aspirin (ECOTRIN) 81 mg,  "Oral, 2 times daily    blood sugar diagnostic Strp 1 strip, Misc.(Non-Drug; Combo Route), 4 times daily    HumuLIN N NPH U-100 Insulin 12 Units, Subcutaneous, Before breakfast, Inject 12 units in the am and 8 units at night.    HUMULIN R REGULAR U-100 INSULN 100 unit/mL injection CHECK BLOOD SUGARS BEFORE MEALS AND AT BEDTIME: < 130 = 0 UNITS 131-180 = 4 UNITS 181-240 = 8 UNITS 241-300 = 10 UNITS 301-350 = 12 UNITS    insulin lispro (HUMALOG U-100 INSULIN) 100 unit/mL injection INJECT 8 UNITS IN AM AND 8 UNITS AT SUPPER    JARDIANCE 10 mg, Oral, Every morning    metFORMIN (GLUCOPHAGE) 1,000 mg, Oral, 2 times daily    ONETOUCH DELICA PLUS LANCET 33 gauge Misc SMARTSIG:device Topical Twice Daily    ONETOUCH ULTRA2 METER Misc SMARTSIG:device Via Meter Twice Daily    progesterone (PROMETRIUM) 200 mg, Oral    pyridoxine (vitamin B6) (B-6) 25 mg, Oral, 3 times daily     27 mg iron- 1 mg Tab 1 tablet, Oral       Review of Systems   12 point review of systems conducted, negative except as stated in the history of present illness. See HPI for details.      Objective     Visit Vitals  /82 (BP Location: Left arm, Patient Position: Sitting, BP Method: Medium (Automatic))   Pulse (!) 117   Ht 5' 4" (1.626 m)   Wt 88 kg (194 lb)   LMP 05/02/2023 (Exact Date)   BMI 33.30 kg/m²        Physical Exam  Vitals and nursing note reviewed.   Constitutional:       Appearance: Normal appearance.      Comments: Increased BMI   HENT:      Head: Normocephalic and atraumatic.      Nose: Nose normal. No congestion.   Cardiovascular:      Rate and Rhythm: Normal rate.   Pulmonary:      Effort: Pulmonary effort is normal.   Skin:     Findings: No rash.      Comments: No rashes, but there are excoriations to the anterior aspects of bilateral lower extremities   Neurological:      Mental Status: She is alert and oriented to person, place, and time.   Psychiatric:         Mood and Affect: Mood normal.         Behavior: Behavior normal.   "       Thought Content: Thought content normal.         Judgment: Judgment normal.           ASSESSMENT/PLAN:     33 y.o.  female with IUP at 24w2d    Pre-existing type 2 diabetes mellitus in pregnancy  There is normal fetal growth with an EFW of 637 g at the 32% and the AC at the 44% on 10/19/2023.    AFV is normal.     Risks associated with diabetes in pregnancy include higher risk for polyhydramnios, fetal macrosomia and  metabolic complications (hypoglycemia, hyperbilirubinemia, hypocalcemia, erythema).     Continue 2200 calorie ADA diet during pregnancy. It is recommended that she have 30 grams of carbohydrates with breakfast, and 60 grams with lunch and dinner. In addition, she should have three snacks in between meals with 15 grams of carbohydrates and at bedtime with 30 grams of carbohydrates.    She did not bring her sugar log.  Values discussed.  Patient reports fasting 71-82, post breakfast 115-122, post lunch 120-130, and post supper 122-134.  Patient advised to continue metformin 1000 mg BID and insulin 12 units of NPH in the morning and 8 units of Humalog in the morning 8 units of Humalog for supper and 8 units of NPH at bedtime.  Continue corrective dose of Humalog for 1 unit for every 8 mg of sugar over 140.    She was advised to keep her blood sugars checks and to send sugars weekly through the portal. The need for strict blood sugar control with goals of treatment to keep pre-prandial blood sugars between 65 and 90 and 1 hr postprandial blood sugars less than 140 was reviewed.     Low-dose aspirin as discussed.    Will plan to recheck fetal growth in 4 weeks. Fetal testing could be started in this setting around 30 weeks gestation. She needs to do fetal kick counts throughout the pregnancy.    With risk for congenital heart disease with type 2 diabetes, she has fetal echo scheduled for 2023.  Advised to keep appointment.    She has not yet done 24 hour urine.  Advised to do.   Order given for followup HgbA1C to do in about 2 weeks.       Increased BMI complicating pregnancy  Body mass index is 33.3 kg/m².  With mild excessive weight gain from last visit, 4 lb in 1 month, she was reminded to follow a healthy low caloric diet and diabetic diet.  Excess weight gain would be associated with gestational hypertension, gestational diabetes and adverse  outcomes, including fetal demise in utero.    With risk factors associated with increased BMI, will start fetal kick counts and continue until delivery.    It is important to lose weight after the pregnancy is over, especially before a future pregnancy was discussed. Breastfeeding may be an important tool in reducing the postpartum weight retention. Fetal risks were discussed with short term risk of fetal/ obesity and long term risk of adolescent component of metabolic syndrome.        At high risk for preeclampsia   With her increased risk for preeclampsia, she agreed to continue asa 81 mg BID until 34 6/7 weeks, then decrease to once daily until delivery.. Preeclampsia precautions reviewed.      Advanced maternal age  Reviewed risks with AMA, including risks for PTL, FGR, anomalies not seen, aneuploidy and stillbirth at term. She previously declined amniocentesis . She is aware of need for  evaluation. She previously declined cfDNA  and already did quad screen that was negative.    She was advised to continue fetal kick counts till delivery in view of the higher risk of fetal demise in utero closer to term with advanced maternal age.      Itching  No rashes appreciated.  Excoriations noted on anterior lower extremities.  She has no itching other than to the lower extremities.  However, with risk of cholestasis of pregnancy, orders given for bile acids and liver function tests.  If evidence of cholestasis, we will treat accordingly with ursodiol.      Follow up in about 4 weeks (around 2023) for MFM follow-up,  Repeat ultrasound, Send sugars weekly.     Future Appointments   Date Time Provider Department Center   11/16/2023  9:00 AM Shabbir Mac MD [facility]   11/16/2023  9:00 AM ROOM 3, [facility] Saleem PA-C

## 2023-10-19 ENCOUNTER — OFFICE VISIT (OUTPATIENT)
Dept: MATERNAL FETAL MEDICINE | Facility: CLINIC | Age: 33
End: 2023-10-19
Payer: MEDICAID

## 2023-10-19 ENCOUNTER — PROCEDURE VISIT (OUTPATIENT)
Dept: MATERNAL FETAL MEDICINE | Facility: CLINIC | Age: 33
End: 2023-10-19
Payer: MEDICAID

## 2023-10-19 VITALS
DIASTOLIC BLOOD PRESSURE: 82 MMHG | BODY MASS INDEX: 33.12 KG/M2 | HEART RATE: 117 BPM | SYSTOLIC BLOOD PRESSURE: 116 MMHG | HEIGHT: 64 IN | WEIGHT: 194 LBS

## 2023-10-19 DIAGNOSIS — O24.112 PRE-EXISTING TYPE 2 DIABETES MELLITUS IN PREGNANCY IN SECOND TRIMESTER: ICD-10-CM

## 2023-10-19 DIAGNOSIS — L29.9 ITCHING: ICD-10-CM

## 2023-10-19 DIAGNOSIS — O09.522 SUPERVISION OF ELDERLY MULTIGRAVIDA IN SECOND TRIMESTER: ICD-10-CM

## 2023-10-19 DIAGNOSIS — O09.90 AT HIGH RISK FOR COMPLICATIONS OF INTRAUTERINE PREGNANCY (IUP): Primary | ICD-10-CM

## 2023-10-19 DIAGNOSIS — O24.111 PRE-EXISTING TYPE 2 DIABETES MELLITUS IN PREGNANCY IN FIRST TRIMESTER: ICD-10-CM

## 2023-10-19 DIAGNOSIS — O99.212 OBESITY AFFECTING PREGNANCY IN SECOND TRIMESTER, UNSPECIFIED OBESITY TYPE: ICD-10-CM

## 2023-10-19 PROCEDURE — 3079F DIAST BP 80-89 MM HG: CPT | Mod: CPTII,S$GLB,,

## 2023-10-19 PROCEDURE — 3074F PR MOST RECENT SYSTOLIC BLOOD PRESSURE < 130 MM HG: ICD-10-PCS | Mod: CPTII,S$GLB,,

## 2023-10-19 PROCEDURE — 3008F PR BODY MASS INDEX (BMI) DOCUMENTED: ICD-10-PCS | Mod: CPTII,S$GLB,,

## 2023-10-19 PROCEDURE — 3044F HG A1C LEVEL LT 7.0%: CPT | Mod: CPTII,S$GLB,,

## 2023-10-19 PROCEDURE — 1160F PR REVIEW ALL MEDS BY PRESCRIBER/CLIN PHARMACIST DOCUMENTED: ICD-10-PCS | Mod: CPTII,S$GLB,,

## 2023-10-19 PROCEDURE — 3044F PR MOST RECENT HEMOGLOBIN A1C LEVEL <7.0%: ICD-10-PCS | Mod: CPTII,S$GLB,,

## 2023-10-19 PROCEDURE — 99214 PR OFFICE/OUTPT VISIT, EST, LEVL IV, 30-39 MIN: ICD-10-PCS | Mod: TH,S$GLB,,

## 2023-10-19 PROCEDURE — 3074F SYST BP LT 130 MM HG: CPT | Mod: CPTII,S$GLB,,

## 2023-10-19 PROCEDURE — 1159F MED LIST DOCD IN RCRD: CPT | Mod: CPTII,S$GLB,,

## 2023-10-19 PROCEDURE — 76816 US MFM PROCEDURE (VIEWPOINT): ICD-10-PCS | Mod: S$GLB,,, | Performed by: OBSTETRICS & GYNECOLOGY

## 2023-10-19 PROCEDURE — 1159F PR MEDICATION LIST DOCUMENTED IN MEDICAL RECORD: ICD-10-PCS | Mod: CPTII,S$GLB,,

## 2023-10-19 PROCEDURE — 3079F PR MOST RECENT DIASTOLIC BLOOD PRESSURE 80-89 MM HG: ICD-10-PCS | Mod: CPTII,S$GLB,,

## 2023-10-19 PROCEDURE — 1160F RVW MEDS BY RX/DR IN RCRD: CPT | Mod: CPTII,S$GLB,,

## 2023-10-19 PROCEDURE — 99214 OFFICE O/P EST MOD 30 MIN: CPT | Mod: TH,S$GLB,,

## 2023-10-19 PROCEDURE — 76816 OB US FOLLOW-UP PER FETUS: CPT | Mod: S$GLB,,, | Performed by: OBSTETRICS & GYNECOLOGY

## 2023-10-19 PROCEDURE — 3008F BODY MASS INDEX DOCD: CPT | Mod: CPTII,S$GLB,,

## 2023-10-19 RX ORDER — METFORMIN HYDROCHLORIDE 1000 MG/1
1000 TABLET ORAL 2 TIMES DAILY
Qty: 60 TABLET | Refills: 0 | Status: SHIPPED | OUTPATIENT
Start: 2023-10-19 | End: 2023-12-12 | Stop reason: SDUPTHER

## 2023-11-14 ENCOUNTER — PATIENT MESSAGE (OUTPATIENT)
Dept: MATERNAL FETAL MEDICINE | Facility: CLINIC | Age: 33
End: 2023-11-14
Payer: MEDICAID

## 2023-11-15 DIAGNOSIS — O24.113 TYPE 2 DIABETES MELLITUS AFFECTING PREGNANCY IN THIRD TRIMESTER, ANTEPARTUM: Primary | ICD-10-CM

## 2023-11-15 DIAGNOSIS — O24.112 PRE-EXISTING TYPE 2 DIABETES MELLITUS IN PREGNANCY IN SECOND TRIMESTER: ICD-10-CM

## 2023-11-15 NOTE — PROGRESS NOTES
Maternal Fetal Medicine Follow Up    Subjective     Patient ID: [id number]    Chief Complaint: high risk pregnancy followup    HPI: Ingrid Titus is a 33 y.o. female  at 28w2d gestation with Estimated Date of Delivery: 24  who is here for follow  up consultation by M.  She has type 2 diabetes mellitus, diagnosed about 9 years ago. She is on metformin 1000 mg BID as well as combination of NPH and Humalog. She had normal fetal echo on 10/25/23.  She had elevated BMI at 31.7 at initial consultation visit. TSH was normal on  at 1.1 and hemoglobin A1c was down to 6.5%.  Follow-up hemoglobin A1c on 2023 was 6.3%.  24 hour urine completed on 2023 and is pending.  Because of past history of itching patient was given an order for bile acid and liver function tests that she did this morning on 2023.  AST was normal at 19 ALT was normal at 7 and bile acid is pending.  Reports her itching is better.  She is of AMA.  She had a quad screen that was negative, and declined additional testing. She is on low dose aspirin 81mg twice daily.        Interval history since last [facility] visit: None.. She denies any leaking fluid, vaginal bleeding, contractions, decreased fetal movement. Denies headaches, visual disturbances, or epigastric pain    Pregnancy complications include:   Patient Active Problem List   Diagnosis    Pre-existing type 2 diabetes mellitus in pregnancy in third trimester    Increased BMI complicating pregnancy in third trimester    At high risk for preeclampsia complications of intrauterine pregnancy (IUP)    Supervision of elderly multigravida in third trimester    Itching        No changes to medical, surgical, family, social, or obstetric history.    Medications:  Current Outpatient Medications   Medication Instructions    aspirin (ECOTRIN) 81 mg, Oral, 2 times daily    blood sugar diagnostic Strp 1 strip, Misc.(Non-Drug; Combo Route), 4 times daily    HumuLIN N NPH U-100  "Insulin 12 Units, Subcutaneous, Before breakfast, Inject 12 units in the am and 8 units at night.    HUMULIN R REGULAR U-100 INSULN 100 unit/mL injection CHECK BLOOD SUGARS BEFORE MEALS AND AT BEDTIME: < 130 = 0 UNITS 131-180 = 4 UNITS 181-240 = 8 UNITS 241-300 = 10 UNITS 301-350 = 12 UNITS    insulin lispro (HUMALOG U-100 INSULIN) 100 unit/mL injection INJECT 8 UNITS IN AM AND 8 UNITS AT SUPPER    JARDIANCE 10 mg, Oral, Every morning    metFORMIN (GLUCOPHAGE) 1,000 mg, Oral, 2 times daily    ONETOUCH DELICA PLUS LANCET 33 gauge Misc SMARTSIG:device Topical Twice Daily    ONETOUCH ULTRA2 METER Misc SMARTSIG:device Via Meter Twice Daily    progesterone (PROMETRIUM) 200 mg, Oral    pyridoxine (vitamin B6) (B-6) 25 mg, Oral, 3 times daily     27 mg iron- 1 mg Tab 1 tablet, Oral       Review of Systems   12 point review of systems conducted, negative except as stated in the history of present illness. See HPI for details.      Objective     Visit Vitals  /78 (BP Location: Left arm, Patient Position: Sitting, BP Method: Medium (Automatic))   Pulse (!) 117   Ht 5' 4" (1.626 m)   Wt 87.1 kg (192 lb)   LMP 05/02/2023 (Exact Date)   BMI 32.96 kg/m²        Physical Exam  Vitals and nursing note reviewed.   Constitutional:       Appearance: Normal appearance.      Comments: Increased BMI   HENT:      Head: Normocephalic and atraumatic.      Nose: Nose normal. No congestion.   Cardiovascular:      Rate and Rhythm: Normal rate.   Pulmonary:      Effort: Pulmonary effort is normal.   Skin:     Findings: No rash.      Comments: No rashes, but there are excoriations to the anterior aspects of bilateral lower extremities   Neurological:      Mental Status: She is alert and oriented to person, place, and time.   Psychiatric:         Mood and Affect: Mood normal.         Behavior: Behavior normal.         Thought Content: Thought content normal.         Judgment: Judgment normal.           ASSESSMENT/PLAN:     33 y.o. "  female with IUP at 28w2d    Pre-existing type 2 diabetes mellitus in pregnancy  There is normal fetal growth with an EFW of 1293 g at the 52% and the AC at the 78% on 2023.  AFV is normal.     Risks associated with diabetes in pregnancy include higher risk for polyhydramnios, fetal macrosomia and  metabolic complications (hypoglycemia, hyperbilirubinemia, hypocalcemia, erythema).     Continue 2200 calorie ADA diet during pregnancy. It is recommended that she have 30 grams of carbohydrates with breakfast, and 60 grams with lunch and dinner. In addition, she should have three snacks in between meals with 15 grams of carbohydrates and at bedtime with 30 grams of carbohydrates.    Patient advised to continue metformin 1000 mg BID and insulin 12 units of NPH in the morning and 8 units of Humalog in the morning 8 units of Humalog for supper and 8 units of NPH at bedtime.  Continue corrective dose of Humalog for 1 unit for every 8 mg of sugar over 140.    Advised to continue to check glucose 4/day and take log to each appointment.      Low-dose aspirin as discussed.    Will plan to recheck fetal growth in 4 weeks. Recommend weekly fetal testing starting at 30 weeks, increased to twice weekly around 32 weeks, to alternate with Primary OB until delivery. Reviewed [facility] instructions.    She has not yet done 24 hour urine. Advised to do.      Increased BMI complicating pregnancy  Advised to continue to follow a healthy low caloric diet and diabetic diet.  Excess weight gain would be associated with gestational hypertension, worsening diabetes and adverse  outcomes, including fetal demise in utero.    It is important to lose weight after the pregnancy is over, especially before a future pregnancy was discussed. Breastfeeding may be an important tool in reducing the postpartum weight retention. Fetal risks were discussed with short term risk of fetal/ obesity and long term risk of adolescent  component of metabolic syndrome.      At high risk for preeclampsia   With her increased risk for preeclampsia, she agreed to continue asa 81 mg BID until 34 6/7 weeks, then decrease to once daily until delivery.. Preeclampsia precautions reviewed.      Advanced maternal age  Reviewed risks with AMA, including risks for PTL, FGR, anomalies not seen, aneuploidy and stillbirth at term. She previously declined amniocentesis . She is aware of need for  evaluation. She previously declined cfDNA  and already did quad screen that was negative.    Reviewed importance of FKC 3/day and prn with instructions to immediately report any decreased fetal movement.      Previous itching  Bile acids and LFT were done on 2023.  ALT AST are normal.  Bile acid is pending.  Her itching is improved do not suspect cholestasis.      Follow up in about 4 weeks (around 2023) for Remind to call blood sugars weekly, M follow-up, BPP.     No future appointments.    Patient was evaluated and examined by Dr. Mac. ANDREA Gandhi, helped in pre charting of part of note.      Components of this note were documented using voice recognition systems and are subject to errors not corrected at proofreading. Please contact the author for any clarifications.

## 2023-11-16 ENCOUNTER — OFFICE VISIT (OUTPATIENT)
Dept: MATERNAL FETAL MEDICINE | Facility: CLINIC | Age: 33
End: 2023-11-16
Payer: MEDICAID

## 2023-11-16 ENCOUNTER — LAB VISIT (OUTPATIENT)
Dept: LAB | Facility: HOSPITAL | Age: 33
End: 2023-11-16
Payer: MEDICAID

## 2023-11-16 ENCOUNTER — PROCEDURE VISIT (OUTPATIENT)
Dept: MATERNAL FETAL MEDICINE | Facility: CLINIC | Age: 33
End: 2023-11-16
Payer: MEDICAID

## 2023-11-16 VITALS
DIASTOLIC BLOOD PRESSURE: 78 MMHG | SYSTOLIC BLOOD PRESSURE: 119 MMHG | BODY MASS INDEX: 32.78 KG/M2 | HEIGHT: 64 IN | HEART RATE: 117 BPM | WEIGHT: 192 LBS

## 2023-11-16 DIAGNOSIS — O24.113 TYPE 2 DIABETES MELLITUS AFFECTING PREGNANCY IN THIRD TRIMESTER, ANTEPARTUM: ICD-10-CM

## 2023-11-16 DIAGNOSIS — O24.112 PRE-EXISTING TYPE 2 DIABETES MELLITUS IN PREGNANCY IN SECOND TRIMESTER: ICD-10-CM

## 2023-11-16 DIAGNOSIS — O09.523 SUPERVISION OF ELDERLY MULTIGRAVIDA IN THIRD TRIMESTER: ICD-10-CM

## 2023-11-16 DIAGNOSIS — O99.213 OBESITY AFFECTING PREGNANCY IN THIRD TRIMESTER, UNSPECIFIED OBESITY TYPE: ICD-10-CM

## 2023-11-16 DIAGNOSIS — O24.113 PRE-EXISTING TYPE 2 DIABETES MELLITUS IN PREGNANCY IN THIRD TRIMESTER: Primary | ICD-10-CM

## 2023-11-16 DIAGNOSIS — O24.113 TYPE 2 DIABETES MELLITUS AFFECTING PREGNANCY IN THIRD TRIMESTER, ANTEPARTUM: Primary | ICD-10-CM

## 2023-11-16 DIAGNOSIS — L29.9 ITCHING: ICD-10-CM

## 2023-11-16 DIAGNOSIS — O09.90 AT HIGH RISK FOR COMPLICATIONS OF INTRAUTERINE PREGNANCY (IUP): ICD-10-CM

## 2023-11-16 LAB
ALBUMIN SERPL-MCNC: 2.8 G/DL (ref 3.5–5)
ALP SERPL-CCNC: 77 UNIT/L (ref 40–150)
ALT SERPL-CCNC: 7 UNIT/L (ref 0–55)
AST SERPL-CCNC: 19 UNIT/L (ref 5–34)
BILIRUB SERPL-MCNC: 0.3 MG/DL
BILIRUBIN DIRECT+TOT PNL SERPL-MCNC: 0.1 MG/DL (ref 0–?)
BILIRUBIN DIRECT+TOT PNL SERPL-MCNC: 0.2 MG/DL (ref 0–0.8)
EST. AVERAGE GLUCOSE BLD GHB EST-MCNC: 134.1 MG/DL
HBA1C MFR BLD: 6.3 %
PATH REV: NORMAL
PROT 24H UR-MCNC: 177.1 MG/24 H (ref 0–165)
PROT SERPL-MCNC: 6.5 GM/DL (ref 6.4–8.3)
PROT UR STRIP-MCNC: 15.4 MG/DL
TOTAL VOLUME  (OHS): 1150 ML

## 2023-11-16 PROCEDURE — 1159F PR MEDICATION LIST DOCUMENTED IN MEDICAL RECORD: ICD-10-PCS | Mod: CPTII,S$GLB,, | Performed by: OBSTETRICS & GYNECOLOGY

## 2023-11-16 PROCEDURE — 3074F SYST BP LT 130 MM HG: CPT | Mod: CPTII,S$GLB,, | Performed by: OBSTETRICS & GYNECOLOGY

## 2023-11-16 PROCEDURE — 80076 HEPATIC FUNCTION PANEL: CPT

## 2023-11-16 PROCEDURE — 99213 OFFICE O/P EST LOW 20 MIN: CPT | Mod: TH,S$GLB,, | Performed by: OBSTETRICS & GYNECOLOGY

## 2023-11-16 PROCEDURE — 3044F HG A1C LEVEL LT 7.0%: CPT | Mod: CPTII,S$GLB,, | Performed by: OBSTETRICS & GYNECOLOGY

## 2023-11-16 PROCEDURE — 76816 PR  US,PREGNANT UTERUS,F/U,TRANSABD APP: ICD-10-PCS | Mod: S$GLB,,, | Performed by: OBSTETRICS & GYNECOLOGY

## 2023-11-16 PROCEDURE — 3078F PR MOST RECENT DIASTOLIC BLOOD PRESSURE < 80 MM HG: ICD-10-PCS | Mod: CPTII,S$GLB,, | Performed by: OBSTETRICS & GYNECOLOGY

## 2023-11-16 PROCEDURE — 1159F MED LIST DOCD IN RCRD: CPT | Mod: CPTII,S$GLB,, | Performed by: OBSTETRICS & GYNECOLOGY

## 2023-11-16 PROCEDURE — 3078F DIAST BP <80 MM HG: CPT | Mod: CPTII,S$GLB,, | Performed by: OBSTETRICS & GYNECOLOGY

## 2023-11-16 PROCEDURE — 84156 ASSAY OF PROTEIN URINE: CPT

## 2023-11-16 PROCEDURE — 36415 COLL VENOUS BLD VENIPUNCTURE: CPT

## 2023-11-16 PROCEDURE — 76816 OB US FOLLOW-UP PER FETUS: CPT | Mod: S$GLB,,, | Performed by: OBSTETRICS & GYNECOLOGY

## 2023-11-16 PROCEDURE — 3044F PR MOST RECENT HEMOGLOBIN A1C LEVEL <7.0%: ICD-10-PCS | Mod: CPTII,S$GLB,, | Performed by: OBSTETRICS & GYNECOLOGY

## 2023-11-16 PROCEDURE — 3008F BODY MASS INDEX DOCD: CPT | Mod: CPTII,S$GLB,, | Performed by: OBSTETRICS & GYNECOLOGY

## 2023-11-16 PROCEDURE — 3074F PR MOST RECENT SYSTOLIC BLOOD PRESSURE < 130 MM HG: ICD-10-PCS | Mod: CPTII,S$GLB,, | Performed by: OBSTETRICS & GYNECOLOGY

## 2023-11-16 PROCEDURE — 99213 PR OFFICE/OUTPT VISIT, EST, LEVL III, 20-29 MIN: ICD-10-PCS | Mod: TH,S$GLB,, | Performed by: OBSTETRICS & GYNECOLOGY

## 2023-11-16 PROCEDURE — 3008F PR BODY MASS INDEX (BMI) DOCUMENTED: ICD-10-PCS | Mod: CPTII,S$GLB,, | Performed by: OBSTETRICS & GYNECOLOGY

## 2023-11-16 PROCEDURE — 82239 BILE ACIDS TOTAL: CPT

## 2023-11-16 PROCEDURE — 83036 HEMOGLOBIN GLYCOSYLATED A1C: CPT

## 2023-11-16 RX ORDER — SYRINGE,SAFETY WITH NEEDLE,1ML 25GX1"
SYRINGE (EA) MISCELLANEOUS
Qty: 90 EACH | Refills: 3 | Status: SHIPPED | OUTPATIENT
Start: 2023-11-16

## 2023-11-21 ENCOUNTER — PATIENT MESSAGE (OUTPATIENT)
Dept: MATERNAL FETAL MEDICINE | Facility: CLINIC | Age: 33
End: 2023-11-21
Payer: MEDICAID

## 2023-11-22 ENCOUNTER — TELEPHONE (OUTPATIENT)
Dept: MATERNAL FETAL MEDICINE | Facility: CLINIC | Age: 33
End: 2023-11-22
Payer: MEDICAID

## 2023-11-22 LAB
BILE AC SERPL-SCNC: 1.21 NMOL/ML
CDCAE SERPL-SCNC: 0.41 NMOL/ML
CHOLATE SERPL-SCNC: 0.22 NMOL/ML
DO-CHOLATE SERPL-SCNC: 0.44 NMOL/ML
URSODEOXYCHOLATE SERPL-SCNC: 0.14 NMOL/ML

## 2023-11-22 NOTE — PROGRESS NOTES
Please call patient and let her know her bile acids were normal (no evidence of cholestasis) as well as her LFTs and her 24 hour urine. HgbA1C was 6.3% (stable)

## 2023-11-22 NOTE — TELEPHONE ENCOUNTER
----- Message from Emily Saleem PA-C sent at 11/22/2023  2:46 PM CST -----  Please call patient and let her know her bile acids were normal (no evidence of cholestasis) as well as her LFTs and her 24 hour urine. HgbA1C was 6.3% (stable)    Pt notified

## 2023-12-05 ENCOUNTER — HOSPITAL ENCOUNTER (EMERGENCY)
Facility: HOSPITAL | Age: 33
Discharge: HOME | End: 2023-12-05
Payer: MEDICAID

## 2023-12-05 VITALS
TEMPERATURE: 98 F | HEART RATE: 92 BPM | OXYGEN SATURATION: 98 % | SYSTOLIC BLOOD PRESSURE: 117 MMHG | DIASTOLIC BLOOD PRESSURE: 70 MMHG

## 2023-12-05 LAB
ALBUMIN SERPL-MCNC: 2.5 G/DL (ref 3.5–5)
ALBUMIN/GLOB SERPL: 0.7 RATIO (ref 1.1–2)
ALP SERPL-CCNC: 104 UNIT/L (ref 40–150)
ALT SERPL-CCNC: 5 UNIT/L (ref 0–55)
APPEARANCE UR: CLEAR
AST SERPL-CCNC: 14 UNIT/L (ref 5–34)
BACTERIA #/AREA URNS AUTO: ABNORMAL /HPF
BASOPHILS # BLD AUTO: 0.02 X10(3)/MCL
BASOPHILS NFR BLD AUTO: 0.3 %
BILIRUB SERPL-MCNC: 0.2 MG/DL
BILIRUB UR QL STRIP.AUTO: NEGATIVE
BUN SERPL-MCNC: 6.5 MG/DL (ref 7–18.7)
CALCIUM SERPL-MCNC: 8.7 MG/DL (ref 8.4–10.2)
CHLORIDE SERPL-SCNC: 109 MMOL/L (ref 98–107)
CO2 SERPL-SCNC: 20 MMOL/L (ref 22–29)
COLOR UR AUTO: ABNORMAL
CREAT SERPL-MCNC: 0.67 MG/DL (ref 0.55–1.02)
CREAT UR-MCNC: 73.8 MG/DL (ref 45–106)
EOSINOPHIL # BLD AUTO: 0.11 X10(3)/MCL (ref 0–0.9)
EOSINOPHIL NFR BLD AUTO: 1.4 %
ERYTHROCYTE [DISTWIDTH] IN BLOOD BY AUTOMATED COUNT: 12.3 % (ref 11.5–17)
GFR SERPLBLD CREATININE-BSD FMLA CKD-EPI: >60 MLS/MIN/1.73/M2
GLOBULIN SER-MCNC: 3.5 GM/DL (ref 2.4–3.5)
GLUCOSE SERPL-MCNC: 197 MG/DL (ref 74–100)
GLUCOSE UR QL STRIP.AUTO: ABNORMAL
HCT VFR BLD AUTO: 33.4 % (ref 37–47)
HGB BLD-MCNC: 11.1 G/DL (ref 12–16)
IMM GRANULOCYTES # BLD AUTO: 0.03 X10(3)/MCL (ref 0–0.04)
IMM GRANULOCYTES NFR BLD AUTO: 0.4 %
KETONES UR QL STRIP.AUTO: NEGATIVE
LDH SERPL-CCNC: 134 U/L (ref 125–220)
LEUKOCYTE ESTERASE UR QL STRIP.AUTO: NEGATIVE
LYMPHOCYTES # BLD AUTO: 2.04 X10(3)/MCL (ref 0.6–4.6)
LYMPHOCYTES NFR BLD AUTO: 26.1 %
MCH RBC QN AUTO: 28.9 PG (ref 27–31)
MCHC RBC AUTO-ENTMCNC: 33.2 G/DL (ref 33–36)
MCV RBC AUTO: 87 FL (ref 80–94)
MONOCYTES # BLD AUTO: 0.55 X10(3)/MCL (ref 0.1–1.3)
MONOCYTES NFR BLD AUTO: 7 %
MUCOUS THREADS URNS QL MICRO: ABNORMAL /LPF
NEUTROPHILS # BLD AUTO: 5.07 X10(3)/MCL (ref 2.1–9.2)
NEUTROPHILS NFR BLD AUTO: 64.8 %
NITRITE UR QL STRIP.AUTO: NEGATIVE
NRBC BLD AUTO-RTO: 0 %
PH UR STRIP.AUTO: 6 [PH]
PLATELET # BLD AUTO: 202 X10(3)/MCL (ref 130–400)
PMV BLD AUTO: 11.3 FL (ref 7.4–10.4)
POTASSIUM SERPL-SCNC: 4 MMOL/L (ref 3.5–5.1)
PROT SERPL-MCNC: 6 GM/DL (ref 6.4–8.3)
PROT UR QL STRIP.AUTO: ABNORMAL
PROT UR STRIP-MCNC: 28.1 MG/DL
RBC # BLD AUTO: 3.84 X10(6)/MCL (ref 4.2–5.4)
RBC #/AREA URNS AUTO: ABNORMAL /HPF
RBC UR QL AUTO: NEGATIVE
SODIUM SERPL-SCNC: 136 MMOL/L (ref 136–145)
SP GR UR STRIP.AUTO: 1.01 (ref 1–1.03)
SQUAMOUS #/AREA URNS LPF: ABNORMAL /HPF
URATE SERPL-MCNC: 3.4 MG/DL (ref 2.6–6)
URINE PROTEIN/CREATININE RATIO (OHS): 0.4
UROBILINOGEN UR STRIP-ACNC: NORMAL
WBC # SPEC AUTO: 7.82 X10(3)/MCL (ref 4.5–11.5)
WBC #/AREA URNS AUTO: ABNORMAL /HPF

## 2023-12-05 PROCEDURE — 85025 COMPLETE CBC W/AUTO DIFF WBC: CPT | Performed by: SPECIALIST

## 2023-12-05 PROCEDURE — 99284 EMERGENCY DEPT VISIT MOD MDM: CPT

## 2023-12-05 PROCEDURE — 80053 COMPREHEN METABOLIC PANEL: CPT | Performed by: SPECIALIST

## 2023-12-05 PROCEDURE — 81001 URINALYSIS AUTO W/SCOPE: CPT | Performed by: SPECIALIST

## 2023-12-05 PROCEDURE — 84550 ASSAY OF BLOOD/URIC ACID: CPT | Performed by: SPECIALIST

## 2023-12-05 PROCEDURE — 82570 ASSAY OF URINE CREATININE: CPT | Performed by: SPECIALIST

## 2023-12-05 PROCEDURE — 83615 LACTATE (LD) (LDH) ENZYME: CPT | Performed by: SPECIALIST

## 2023-12-06 NOTE — ED PROVIDER NOTES
[facility] NOTE     Admit Date: 2023  [facility] Physician: Kike Shahid  Primary OBGYN: Dr. Js Cote    Admit Diagnosis/Chief Complaint:       Chief Complaint   Patient presents with    Vaginal Bleeding       Hospital Course:  Ingrid Titus is a 33 y.o.  at 31w0d presents complaining of a large amount of vaginal bleeding after going to the bathroom to urinate earlier tonight.  Patient reports this was a 1 time episode and afterwards quickly resolved to dark brown discharge followed by no discharge.  She denies any pains or other complaints at this time.  She denies pelvic exam, strenuous activity, or sexual intercourse within the last 48 hours.    This IUP is complicated by type 2 diabetes requiring metformin and insulin..    Patient denies leakage of fluid, contractions, headache, vision changes, RUQ pain, dysuria, fever, and nausea/vomiting.  Fetal Movement: normal.      /74   Pulse 93   Temp 98.2 °F (36.8 °C) (Oral)   LMP 2023 (Exact Date)   SpO2 97%   Temp:  [98.2 °F (36.8 °C)] 98.2 °F (36.8 °C)  Pulse:  [86] 86  BP: (159)/(79) 159/79    General: in no respiratory distress and acyanotic  Cardiovascular: regular rate and rhythm no murmurs  Respiratory: clear to auscultation, no wheezes, rales or rhonchi, symmetric air entry  Abdominal: fundus soft, nontender 30 weeks size FHT present  Back: lumbar tenderness absent CVA tenderness none  Extremeties no redness or tenderness in the calves or thighs no edema    SSE:   SVE:  Cervix Is long thick and closed.  A whitish discharge is noted.  No evidence of any active bleeding at this time.    FHT: Category 1  TOCO:  No contractions    Medical Decision Making:  Patient is episode of bleeding appears isolated.  Blood pressures during examination are elevated.  Preeclampsia labs will be obtained and continuous monitoring of fetus and blood pressures at this time.    LABS:     Recent Results (from the past 24 hour(s))   Comprehensive  metabolic panel    Collection Time: 12/05/23  8:59 PM   Result Value Ref Range    Sodium Level 136 136 - 145 mmol/L    Potassium Level 4.0 3.5 - 5.1 mmol/L    Chloride 109 (H) 98 - 107 mmol/L    Carbon Dioxide 20 (L) 22 - 29 mmol/L    Glucose Level 197 (H) 74 - 100 mg/dL    Blood Urea Nitrogen 6.5 (L) 7.0 - 18.7 mg/dL    Creatinine 0.67 0.55 - 1.02 mg/dL    Calcium Level Total 8.7 8.4 - 10.2 mg/dL    Protein Total 6.0 (L) 6.4 - 8.3 gm/dL    Albumin Level 2.5 (L) 3.5 - 5.0 g/dL    Globulin 3.5 2.4 - 3.5 gm/dL    Albumin/Globulin Ratio 0.7 (L) 1.1 - 2.0 ratio    Bilirubin Total 0.2 <=1.5 mg/dL    Alkaline Phosphatase 104 40 - 150 unit/L    Alanine Aminotransferase 5 0 - 55 unit/L    Aspartate Aminotransferase 14 5 - 34 unit/L    eGFR >60 mls/min/1.73/m2   Uric acid    Collection Time: 12/05/23  8:59 PM   Result Value Ref Range    Uric Acid 3.4 2.6 - 6.0 mg/dL   Lactate Dehydrogenase    Collection Time: 12/05/23  8:59 PM   Result Value Ref Range    Lactate Dehydrogenase 134 125 - 220 U/L   CBC with Differential    Collection Time: 12/05/23  8:59 PM   Result Value Ref Range    WBC 7.82 4.50 - 11.50 x10(3)/mcL    RBC 3.84 (L) 4.20 - 5.40 x10(6)/mcL    Hgb 11.1 (L) 12.0 - 16.0 g/dL    Hct 33.4 (L) 37.0 - 47.0 %    MCV 87.0 80.0 - 94.0 fL    MCH 28.9 27.0 - 31.0 pg    MCHC 33.2 33.0 - 36.0 g/dL    RDW 12.3 11.5 - 17.0 %    Platelet 202 130 - 400 x10(3)/mcL    MPV 11.3 (H) 7.4 - 10.4 fL    Neut % 64.8 %    Lymph % 26.1 %    Mono % 7.0 %    Eos % 1.4 %    Basophil % 0.3 %    Lymph # 2.04 0.6 - 4.6 x10(3)/mcL    Neut # 5.07 2.1 - 9.2 x10(3)/mcL    Mono # 0.55 0.1 - 1.3 x10(3)/mcL    Eos # 0.11 0 - 0.9 x10(3)/mcL    Baso # 0.02 <=0.2 x10(3)/mcL    IG# 0.03 0 - 0.04 x10(3)/mcL    IG% 0.4 %    NRBC% 0.0 %   Urinalysis, Reflex to Urine Culture    Collection Time: 12/05/23  9:17 PM    Specimen: Urine   Result Value Ref Range    Color, UA Light-Yellow Yellow, Light-Yellow, Colorless, Straw, Dark-Yellow    Appearance, UA Clear Clear     Specific Gravity, UA 1.015 1.005 - 1.030    pH, UA 6.0 5.0 - 8.5    Protein, UA Trace (A) Negative    Glucose, UA 4+ (A) Negative, Normal    Ketones, UA Negative Negative    Blood, UA Negative Negative    Bilirubin, UA Negative Negative    Urobilinogen, UA Normal 0.2, 1.0, Normal    Nitrites, UA Negative Negative    Leukocyte Esterase, UA Negative Negative    WBC, UA 0-5 None Seen, 0-2, 3-5, 0-5 /HPF    Bacteria, UA Occasional (A) None Seen, Trace /HPF    Squamous Epithelial Cells, UA Trace None Seen /HPF    Mucous, UA Trace (A) None Seen /LPF    RBC, UA 0-5 None Seen, 0-2, 3-5, 0-5 /HPF   Protein/Creatinine Ratio, Urine    Collection Time: 23  9:17 PM   Result Value Ref Range    Urine Protein Level 28.1 mg/dL    Urine Creatinine 73.8 45.0 - 106.0 mg/dL    Urine Protein/Creatinine Ratio 0.4      [unfilled]     Imaging Results    None          ASSESMENT: Ingrid Titus is a 33 y.o.   at 31w0d with vaginal bleeding in pregnancy resolved.  Initial elevated blood pressures now resolved.  Preeclampsia labs within normal limits.    Discharge Diagnosis/Clinical Impression:  Pregnancy 31 weeks.  Spotting in pregnancy 3rd trimester.    Status:Improved/stable    Disposition:  discharged to home    Medications:       Patient Instructions:   Current Discharge Medication List        CONTINUE these medications which have NOT CHANGED    Details   aspirin (ECOTRIN) 81 MG EC tablet Take 1 tablet (81 mg total) by mouth 2 (two) times a day.  Refills: 0    Associated Diagnoses: Pre-existing type 2 diabetes mellitus in pregnancy in second trimester      blood sugar diagnostic Strp 1 strip by Misc.(Non-Drug; Combo Route) route 4 (four) times daily.  Qty: 120 strip, Refills: 0      insulin lispro (HUMALOG U-100 INSULIN) 100 unit/mL injection INJECT 8 UNITS IN AM AND 8 UNITS AT SUPPER  Qty: 10 mL, Refills: 2    Comments: Inject 8 units in a.m. and 8 units at supper.  Associated Diagnoses: Pre-existing type 2  diabetes mellitus in pregnancy in first trimester      HUMULIN R REGULAR U-100 INSULN 100 unit/mL injection CHECK BLOOD SUGARS BEFORE MEALS AND AT BEDTIME: < 130 = 0 UNITS 131-180 = 4 UNITS 181-240 = 8 UNITS 241-300 = 10 UNITS 301-350 = 12 UNITS      insulin NPH (HUMULIN N NPH U-100 INSULIN) 100 unit/mL injection Inject 12 Units into the skin before breakfast. Inject 12 units in the am and 8 units at night.  Qty: 3.6 mL, Refills: 2    Associated Diagnoses: Pre-existing type 2 diabetes mellitus in pregnancy in first trimester      insulin syringe-needle U-100 1 mL 31 gauge x 5/16 Syrg Use 1 syringe to skin three times daily  Qty: 90 each, Refills: 3    Associated Diagnoses: Type 2 diabetes mellitus affecting pregnancy in third trimester, antepartum      JARDIANCE 10 mg tablet Take 10 mg by mouth every morning.      metFORMIN (GLUCOPHAGE) 1000 MG tablet Take 1 tablet (1,000 mg total) by mouth 2 (two) times daily.  Qty: 60 tablet, Refills: 0    Associated Diagnoses: Pre-existing type 2 diabetes mellitus in pregnancy in first trimester      ONETOUCH DELICA PLUS LANCET 33 gauge Misc SMARTSIG:device Topical Twice Daily      ONETOUCH ULTRA2 METER Misc SMARTSIG:device Via Meter Twice Daily      progesterone (PROMETRIUM) 200 MG capsule Take 200 mg by mouth.      pyridoxine, vitamin B6, (B-6) 25 MG Tab Take 1 tablet (25 mg total) by mouth 3 (three) times daily.  Qty: 90 tablet, Refills: 1    Associated Diagnoses: Hyperemesis affecting pregnancy, antepartum       27 mg iron- 1 mg Tab Take 1 tablet by mouth.             - Pt was given routine pregnancy instructions including to return to triage if she had any vaginal bleeding (other than spotting for the next 48hrs), any loss of fluid like her water broke, decreased fetal movement, or contractions Q 5min lasting for 2 or more hours. Pt was also instructed to drink copious water. Patient voiced understanding of all these instructions and was subsequently discharged  home.    She will follow up with her primary OB.    No discharge procedures on file.    Kike Shahid MD  OB-GYN Hospitalist       Kike Shahid MD  12/05/23 [id number]

## 2023-12-12 ENCOUNTER — PATIENT MESSAGE (OUTPATIENT)
Dept: MATERNAL FETAL MEDICINE | Facility: CLINIC | Age: 33
End: 2023-12-12
Payer: MEDICAID

## 2023-12-12 DIAGNOSIS — O24.113 PRE-EXISTING TYPE 2 DIABETES MELLITUS IN PREGNANCY IN THIRD TRIMESTER: Primary | ICD-10-CM

## 2023-12-12 DIAGNOSIS — O24.111 PRE-EXISTING TYPE 2 DIABETES MELLITUS IN PREGNANCY IN FIRST TRIMESTER: ICD-10-CM

## 2023-12-12 RX ORDER — INSULIN HUMAN 100 [IU]/ML
INJECTION, SUSPENSION SUBCUTANEOUS
Qty: 10 ML | Refills: 2 | Status: SHIPPED | OUTPATIENT
Start: 2023-12-12

## 2023-12-12 RX ORDER — METFORMIN HYDROCHLORIDE 1000 MG/1
1000 TABLET ORAL 2 TIMES DAILY
Qty: 60 TABLET | Refills: 0 | Status: SHIPPED | OUTPATIENT
Start: 2023-12-12 | End: 2024-01-11

## 2023-12-12 RX ORDER — METFORMIN HYDROCHLORIDE 1000 MG/1
1000 TABLET ORAL 2 TIMES DAILY
Qty: 60 TABLET | Refills: 0 | Status: SHIPPED | OUTPATIENT
Start: 2023-12-12 | End: 2023-12-12

## 2023-12-14 ENCOUNTER — PROCEDURE VISIT (OUTPATIENT)
Dept: MATERNAL FETAL MEDICINE | Facility: CLINIC | Age: 33
End: 2023-12-14
Payer: MEDICAID

## 2023-12-14 ENCOUNTER — OFFICE VISIT (OUTPATIENT)
Dept: MATERNAL FETAL MEDICINE | Facility: CLINIC | Age: 33
End: 2023-12-14
Payer: MEDICAID

## 2023-12-14 VITALS
BODY MASS INDEX: 34.59 KG/M2 | DIASTOLIC BLOOD PRESSURE: 84 MMHG | SYSTOLIC BLOOD PRESSURE: 134 MMHG | HEART RATE: 101 BPM | HEIGHT: 64 IN | WEIGHT: 202.63 LBS

## 2023-12-14 DIAGNOSIS — O24.113 PRE-EXISTING TYPE 2 DIABETES MELLITUS IN PREGNANCY IN THIRD TRIMESTER: ICD-10-CM

## 2023-12-14 DIAGNOSIS — L29.9 ITCHING: Primary | ICD-10-CM

## 2023-12-14 DIAGNOSIS — O26.03 EXCESSIVE WEIGHT GAIN DURING PREGNANCY IN THIRD TRIMESTER: ICD-10-CM

## 2023-12-14 DIAGNOSIS — O99.213 OBESITY AFFECTING PREGNANCY IN THIRD TRIMESTER, UNSPECIFIED OBESITY TYPE: ICD-10-CM

## 2023-12-14 DIAGNOSIS — O09.90 AT HIGH RISK FOR COMPLICATIONS OF INTRAUTERINE PREGNANCY (IUP): ICD-10-CM

## 2023-12-14 DIAGNOSIS — O16.3 HIGH BLOOD PRESSURE AFFECTING PREGNANCY IN THIRD TRIMESTER, ANTEPARTUM: ICD-10-CM

## 2023-12-14 DIAGNOSIS — O09.523 SUPERVISION OF ELDERLY MULTIGRAVIDA IN THIRD TRIMESTER: ICD-10-CM

## 2023-12-14 PROCEDURE — 3079F PR MOST RECENT DIASTOLIC BLOOD PRESSURE 80-89 MM HG: ICD-10-PCS | Mod: CPTII,S$GLB,, | Performed by: OBSTETRICS & GYNECOLOGY

## 2023-12-14 PROCEDURE — 3008F BODY MASS INDEX DOCD: CPT | Mod: CPTII,S$GLB,, | Performed by: OBSTETRICS & GYNECOLOGY

## 2023-12-14 PROCEDURE — 3044F PR MOST RECENT HEMOGLOBIN A1C LEVEL <7.0%: ICD-10-PCS | Mod: CPTII,S$GLB,, | Performed by: OBSTETRICS & GYNECOLOGY

## 2023-12-14 PROCEDURE — 3075F PR MOST RECENT SYSTOLIC BLOOD PRESS GE 130-139MM HG: ICD-10-PCS | Mod: CPTII,S$GLB,, | Performed by: OBSTETRICS & GYNECOLOGY

## 2023-12-14 PROCEDURE — 76819 FETAL BIOPHYS PROFIL W/O NST: CPT | Mod: S$GLB,,, | Performed by: OBSTETRICS & GYNECOLOGY

## 2023-12-14 PROCEDURE — 1159F PR MEDICATION LIST DOCUMENTED IN MEDICAL RECORD: ICD-10-PCS | Mod: CPTII,S$GLB,, | Performed by: OBSTETRICS & GYNECOLOGY

## 2023-12-14 PROCEDURE — 76819 PR US, OB, FETAL BIOPHYSICAL, W/O NST: ICD-10-PCS | Mod: S$GLB,,, | Performed by: OBSTETRICS & GYNECOLOGY

## 2023-12-14 PROCEDURE — 76816 OB US FOLLOW-UP PER FETUS: CPT | Mod: S$GLB,,, | Performed by: OBSTETRICS & GYNECOLOGY

## 2023-12-14 PROCEDURE — 99214 OFFICE O/P EST MOD 30 MIN: CPT | Mod: TH,S$GLB,, | Performed by: OBSTETRICS & GYNECOLOGY

## 2023-12-14 PROCEDURE — 3008F PR BODY MASS INDEX (BMI) DOCUMENTED: ICD-10-PCS | Mod: CPTII,S$GLB,, | Performed by: OBSTETRICS & GYNECOLOGY

## 2023-12-14 PROCEDURE — 3075F SYST BP GE 130 - 139MM HG: CPT | Mod: CPTII,S$GLB,, | Performed by: OBSTETRICS & GYNECOLOGY

## 2023-12-14 PROCEDURE — 3044F HG A1C LEVEL LT 7.0%: CPT | Mod: CPTII,S$GLB,, | Performed by: OBSTETRICS & GYNECOLOGY

## 2023-12-14 PROCEDURE — 76816 PR  US,PREGNANT UTERUS,F/U,TRANSABD APP: ICD-10-PCS | Mod: S$GLB,,, | Performed by: OBSTETRICS & GYNECOLOGY

## 2023-12-14 PROCEDURE — 3079F DIAST BP 80-89 MM HG: CPT | Mod: CPTII,S$GLB,, | Performed by: OBSTETRICS & GYNECOLOGY

## 2023-12-14 PROCEDURE — 1159F MED LIST DOCD IN RCRD: CPT | Mod: CPTII,S$GLB,, | Performed by: OBSTETRICS & GYNECOLOGY

## 2023-12-14 PROCEDURE — 99214 PR OFFICE/OUTPT VISIT, EST, LEVL IV, 30-39 MIN: ICD-10-PCS | Mod: TH,S$GLB,, | Performed by: OBSTETRICS & GYNECOLOGY

## 2023-12-14 NOTE — PROGRESS NOTES
Maternal Fetal Medicine Follow Up    Subjective     Patient ID: [id number]    Chief Complaint: high risk pregnancy followup    HPI: Ingrid Titus is a 33 y.o. female  at 32w2d gestation with Estimated Date of Delivery: 24  who is here for follow  up consultation by M.  She has type 2 diabetes mellitus, diagnosed about 9 years ago. She is on metformin 1000 mg BID and insulin 12 units of NPH in the morning and 8 units of Humalog in the morning 8 units of Humalog for supper and 8 units of NPH at bedtime.  She has corrective dose of Humalog for 1 unit for every 8 mg of sugar over 140.  She had normal fetal echo on 10/25/23.  She had elevated BMI at 31.7 at initial consultation visit. TSH was normal on  at 1.1 and hemoglobin A1c was down to 6.5%.  Follow-up hemoglobin A1c on 2023 was 6.3%.  On 2023, she had 24 hour urine with 177 mg protein. Because of past history of itching patient was given an order for bile acid and liver function tests that were normal on 2023 with ALT 7, AST 19, BA 1.21. Reports her itching is better.  She is of advanced maternal age and will be 33 by the EDC.  She had a quad screen that was negative, and declined additional testing. She is on low dose aspirin 81mg twice daily.        Interval history since last [facility] visit: None.. She denies any leaking fluid, vaginal bleeding, contractions, decreased fetal movement. Denies headaches, visual disturbances, or epigastric pain    Pregnancy complications include:   Patient Active Problem List   Diagnosis    Pre-existing type 2 diabetes mellitus in pregnancy in third trimester    Increased BMI complicating pregnancy in third trimester    At high risk for preeclampsia complications of intrauterine pregnancy (IUP)    Supervision of elderly multigravida in third trimester    Itching    High blood pressure affecting pregnancy in third trimester, antepartum    Excessive weight gain during pregnancy in third  "trimester        No changes to medical, surgical, family, social, or obstetric history.    Medications:  Current Outpatient Medications   Medication Instructions    aspirin (ECOTRIN) 81 mg, Oral, 2 times daily    blood sugar diagnostic Strp 1 strip, Misc.(Non-Drug; Combo Route), 4 times daily    HUMULIN R REGULAR U-100 INSULN 100 unit/mL injection CHECK BLOOD SUGARS BEFORE MEALS AND AT BEDTIME: < 130 = 0 UNITS 131-180 = 4 UNITS 181-240 = 8 UNITS 241-300 = 10 UNITS 301-350 = 12 UNITS    insulin lispro (HUMALOG U-100 INSULIN) 100 unit/mL injection INJECT 8 UNITS IN AM AND 8 UNITS AT SUPPER    insulin NPH (HUMULIN N NPH U-100 INSULIN) 100 unit/mL injection Inject 12 units in the am and 8 units at night.    insulin syringe-needle U-100 1 mL 31 gauge x 5/16 Syrg Use 1 syringe to skin three times daily    JARDIANCE 10 mg, Oral, Every morning    metFORMIN (GLUCOPHAGE) 1,000 mg, Oral, 2 times daily    ONETOUCH DELICA PLUS LANCET 33 gauge Misc SMARTSIG:device Topical Twice Daily    ONETOUCH ULTRA2 METER Misc SMARTSIG:device Via Meter Twice Daily    progesterone (PROMETRIUM) 200 mg, Oral    pyridoxine (vitamin B6) (B-6) 25 mg, Oral, 3 times daily     27 mg iron- 1 mg Tab 1 tablet, Oral       Review of Systems   12 point review of systems conducted, negative except as stated in the history of present illness. See HPI for details.      Objective     Visit Vitals  /84 (BP Location: Right arm, Patient Position: Sitting, BP Method: Large (Automatic))   Pulse 101   Ht 5' 4" (1.626 m)   Wt 91.9 kg (202 lb 9.6 oz)   LMP 05/02/2023 (Exact Date)   BMI 34.78 kg/m²        Physical Exam  Vitals and nursing note reviewed.   Constitutional:       Appearance: Normal appearance.      Comments: Increased BMI   HENT:      Head: Normocephalic and atraumatic.      Nose: Nose normal. No congestion.   Cardiovascular:      Rate and Rhythm: Normal rate.   Pulmonary:      Effort: Pulmonary effort is normal.   Skin:     Findings: No rash. "      Comments: No rashes, but there are excoriations to the anterior aspects of bilateral lower extremities   Neurological:      Mental Status: She is alert and oriented to person, place, and time.   Psychiatric:         Mood and Affect: Mood normal.         Behavior: Behavior normal.         Thought Content: Thought content normal.         Judgment: Judgment normal.           ASSESSMENT/PLAN:     33 y.o.  female with IUP at 32w2d    Pre-existing type 2 diabetes mellitus in pregnancy  There is normal fetal growth with an EFW of 2013 g at the 32% and the AC at the 79% on 2023.  AFV is normal.  BPP .    Risks associated with diabetes in pregnancy include higher risk for polyhydramnios, fetal macrosomia and  metabolic complications (hypoglycemia, hyperbilirubinemia, hypocalcemia, erythema).     Continue 2200 calorie ADA diet during pregnancy. It is recommended that she have 30 grams of carbohydrates with breakfast, and 60 grams with lunch and dinner. In addition, she should have three snacks in between meals with 15 grams of carbohydrates and at bedtime with 30 grams of carbohydrates.    Patient advised to continue metformin 1000 mg BID and insulin 12 units of NPH in the morning and 8 units of Humalog in the morning 8 units of Humalog for supper and 8 units of NPH at bedtime.  Continue corrective dose of Humalog for 1 unit for every 8 mg of sugar over 140.    Advised to continue to check glucose 2/day alternating times and take log to each appointment.      Low-dose aspirin as discussed.    Will plan to recheck fetal growth in 4 weeks. Recommend twice weekly fetal testing, to alternate with Primary OB until delivery. Reviewed [facility] instructions.      Increased BMI complicating pregnancy  Body mass index is 34.78 kg/m². With excessive weight gain from last visit, 10 lbs in 1 month , she was advised to decrease caloric intake and was reminded of the importance of avoiding excessive weight gain.   Excess weight gain would be associated with gestational hypertension, gestational diabetes and adverse  outcomes, including fetal demise in utero.    With risk factors associated with increased BMI, she is to do fetal kick counts throughout the pregnancy (after 24 weeks).    It is important to lose weight after the pregnancy is over, especially before a future pregnancy was discussed. Breastfeeding may be an important tool in reducing the postpartum weight retention. Fetal risks were discussed with short term risk of fetal/ obesity and long term risk of adolescent component of metabolic syndrome.         At high risk for preeclampsia   With her increased risk for preeclampsia, she agreed to continue asa 81 mg BID until 34 6/7 weeks, then decrease to once daily until delivery.. Preeclampsia precautions reviewed.      Advanced maternal age  Reviewed risks with AMA, including risks for PTL, FGR, anomalies not seen, aneuploidy and stillbirth at term. She previously declined amniocentesis . She is aware of need for  evaluation. She previously declined cfDNA  and already did quad screen that was negative.    Reviewed importance of FKC 3/day and prn with instructions to immediately report any decreased fetal movement.      Previous itching  Bile acids and LFTs were normal on 2023.  Her itching is improved do not suspect cholestasis.  Expectant management.      Elevated BP reading x1 without diagnosis of hypertension  BP Readings from Last 1 Encounters:   23 134/84   She had an initial reading of 143/83.    Patient has no symptoms.  Advised her that if persistently elevated maybe significant.  However out of caution agreed to do preeclampsia labs. Patient will go to do the lab work today.  Preeclampsia warnings were given.  Patient was advised to come in immediately if she has any headaches, vision problems, epigastric pain, or decreased fetal movement at any time.      Blood sugars are  well controlled.  I did not make any change on dose of insulin.  With labile blood pressure and patient being at risk for preeclampsia, elected to do outpatient labs, along with preeclampsia warnings.  Fetal kick count instructions.  We will plan twice weekly fetal testing till delivery with multiple comorbidities.  Risks from excessive weight gain reviewed.  Itching resolved with normal bile acids.    Follow up in about 1 week (around 12/21/2023) for M follow-up, BPP.     Future Appointments   Date Time Provider Department Center   12/21/2023  8:30 AM Shabbir Mac MD [facility]   12/21/2023  8:30 AM ROOM 1 AND 2, [facility]       Patient was evaluated and examined by Dr. Mac. KARLEE Farr, helped in pre charting of part of note.      Components of this note were documented using voice recognition systems and are subject to errors not corrected at proofreading. Please contact the author for any clarifications.

## 2023-12-19 DIAGNOSIS — O24.113 PRE-EXISTING TYPE 2 DIABETES MELLITUS IN PREGNANCY IN THIRD TRIMESTER: Primary | ICD-10-CM

## 2023-12-21 ENCOUNTER — OFFICE VISIT (OUTPATIENT)
Dept: MATERNAL FETAL MEDICINE | Facility: CLINIC | Age: 33
End: 2023-12-21
Payer: MEDICAID

## 2023-12-21 ENCOUNTER — HOSPITAL ENCOUNTER (OUTPATIENT)
Facility: HOSPITAL | Age: 33
Discharge: HOME | End: 2023-12-21
Attending: OBSTETRICS & GYNECOLOGY | Admitting: OBSTETRICS & GYNECOLOGY
Payer: MEDICAID

## 2023-12-21 ENCOUNTER — PROCEDURE VISIT (OUTPATIENT)
Dept: MATERNAL FETAL MEDICINE | Facility: CLINIC | Age: 33
End: 2023-12-21
Payer: MEDICAID

## 2023-12-21 VITALS
SYSTOLIC BLOOD PRESSURE: 149 MMHG | HEIGHT: 64 IN | DIASTOLIC BLOOD PRESSURE: 89 MMHG | HEART RATE: 84 BPM | WEIGHT: 208 LBS | BODY MASS INDEX: 35.51 KG/M2

## 2023-12-21 VITALS — SYSTOLIC BLOOD PRESSURE: 127 MMHG | HEART RATE: 82 BPM | DIASTOLIC BLOOD PRESSURE: 67 MMHG

## 2023-12-21 DIAGNOSIS — O99.213 OBESITY AFFECTING PREGNANCY IN THIRD TRIMESTER, UNSPECIFIED OBESITY TYPE: ICD-10-CM

## 2023-12-21 DIAGNOSIS — O09.90 AT HIGH RISK FOR COMPLICATIONS OF INTRAUTERINE PREGNANCY (IUP): Primary | ICD-10-CM

## 2023-12-21 DIAGNOSIS — O24.113 PRE-EXISTING TYPE 2 DIABETES MELLITUS IN PREGNANCY IN THIRD TRIMESTER: ICD-10-CM

## 2023-12-21 DIAGNOSIS — O40.3XX0 POLYHYDRAMNIOS IN THIRD TRIMESTER COMPLICATION, SINGLE OR UNSPECIFIED FETUS: ICD-10-CM

## 2023-12-21 DIAGNOSIS — O16.9 HYPERTENSION AFFECTING PREGNANCY: ICD-10-CM

## 2023-12-21 DIAGNOSIS — O09.523 SUPERVISION OF ELDERLY MULTIGRAVIDA IN THIRD TRIMESTER: ICD-10-CM

## 2023-12-21 DIAGNOSIS — O13.3 GESTATIONAL HYPERTENSION, THIRD TRIMESTER: ICD-10-CM

## 2023-12-21 DIAGNOSIS — O26.03 EXCESSIVE WEIGHT GAIN DURING PREGNANCY IN THIRD TRIMESTER: ICD-10-CM

## 2023-12-21 LAB
ALBUMIN SERPL-MCNC: 2.2 G/DL (ref 3.5–5)
ALBUMIN/GLOB SERPL: 0.6 RATIO (ref 1.1–2)
ALP SERPL-CCNC: 127 UNIT/L (ref 40–150)
ALT SERPL-CCNC: <5 UNIT/L (ref 0–55)
AST SERPL-CCNC: 16 UNIT/L (ref 5–34)
BASOPHILS # BLD AUTO: 0.02 X10(3)/MCL
BASOPHILS NFR BLD AUTO: 0.3 %
BILIRUB SERPL-MCNC: 0.2 MG/DL
BUN SERPL-MCNC: 5.2 MG/DL (ref 7–18.7)
CALCIUM SERPL-MCNC: 8.3 MG/DL (ref 8.4–10.2)
CHLORIDE SERPL-SCNC: 109 MMOL/L (ref 98–107)
CO2 SERPL-SCNC: 21 MMOL/L (ref 22–29)
CREAT SERPL-MCNC: 0.63 MG/DL (ref 0.55–1.02)
EOSINOPHIL # BLD AUTO: 0.14 X10(3)/MCL (ref 0–0.9)
EOSINOPHIL NFR BLD AUTO: 1.8 %
ERYTHROCYTE [DISTWIDTH] IN BLOOD BY AUTOMATED COUNT: 12 % (ref 11.5–17)
GFR SERPLBLD CREATININE-BSD FMLA CKD-EPI: >60 MLS/MIN/1.73/M2
GLOBULIN SER-MCNC: 3.5 GM/DL (ref 2.4–3.5)
GLUCOSE SERPL-MCNC: 135 MG/DL (ref 74–100)
HCT VFR BLD AUTO: 34.8 % (ref 37–47)
HGB BLD-MCNC: 11.2 G/DL (ref 12–16)
IMM GRANULOCYTES # BLD AUTO: 0.04 X10(3)/MCL (ref 0–0.04)
IMM GRANULOCYTES NFR BLD AUTO: 0.5 %
LDH SERPL-CCNC: 185 U/L (ref 125–220)
LYMPHOCYTES # BLD AUTO: 1.62 X10(3)/MCL (ref 0.6–4.6)
LYMPHOCYTES NFR BLD AUTO: 21.2 %
MCH RBC QN AUTO: 28.1 PG (ref 27–31)
MCHC RBC AUTO-ENTMCNC: 32.2 G/DL (ref 33–36)
MCV RBC AUTO: 87.2 FL (ref 80–94)
MONOCYTES # BLD AUTO: 0.53 X10(3)/MCL (ref 0.1–1.3)
MONOCYTES NFR BLD AUTO: 6.9 %
NEUTROPHILS # BLD AUTO: 5.3 X10(3)/MCL (ref 2.1–9.2)
NEUTROPHILS NFR BLD AUTO: 69.3 %
NRBC BLD AUTO-RTO: 0 %
PLATELET # BLD AUTO: 203 X10(3)/MCL (ref 130–400)
PMV BLD AUTO: 12 FL (ref 7.4–10.4)
POTASSIUM SERPL-SCNC: 4.3 MMOL/L (ref 3.5–5.1)
PROT SERPL-MCNC: 5.7 GM/DL (ref 6.4–8.3)
RBC # BLD AUTO: 3.99 X10(6)/MCL (ref 4.2–5.4)
SODIUM SERPL-SCNC: 136 MMOL/L (ref 136–145)
URATE SERPL-MCNC: 4.5 MG/DL (ref 2.6–6)
WBC # SPEC AUTO: 7.65 X10(3)/MCL (ref 4.5–11.5)

## 2023-12-21 PROCEDURE — 3079F PR MOST RECENT DIASTOLIC BLOOD PRESSURE 80-89 MM HG: ICD-10-PCS | Mod: CPTII,S$GLB,, | Performed by: OBSTETRICS & GYNECOLOGY

## 2023-12-21 PROCEDURE — 99214 PR OFFICE/OUTPT VISIT, EST, LEVL IV, 30-39 MIN: ICD-10-PCS | Mod: TH,S$GLB,, | Performed by: OBSTETRICS & GYNECOLOGY

## 2023-12-21 PROCEDURE — 1159F PR MEDICATION LIST DOCUMENTED IN MEDICAL RECORD: ICD-10-PCS | Mod: CPTII,S$GLB,, | Performed by: OBSTETRICS & GYNECOLOGY

## 2023-12-21 PROCEDURE — 83615 LACTATE (LD) (LDH) ENZYME: CPT | Performed by: OBSTETRICS & GYNECOLOGY

## 2023-12-21 PROCEDURE — 3077F SYST BP >= 140 MM HG: CPT | Mod: CPTII,S$GLB,, | Performed by: OBSTETRICS & GYNECOLOGY

## 2023-12-21 PROCEDURE — 76819 PR US, OB, FETAL BIOPHYSICAL, W/O NST: ICD-10-PCS | Mod: S$GLB,,, | Performed by: OBSTETRICS & GYNECOLOGY

## 2023-12-21 PROCEDURE — 76819 FETAL BIOPHYS PROFIL W/O NST: CPT | Mod: S$GLB,,, | Performed by: OBSTETRICS & GYNECOLOGY

## 2023-12-21 PROCEDURE — 3008F BODY MASS INDEX DOCD: CPT | Mod: CPTII,S$GLB,, | Performed by: OBSTETRICS & GYNECOLOGY

## 2023-12-21 PROCEDURE — 85025 COMPLETE CBC W/AUTO DIFF WBC: CPT | Performed by: OBSTETRICS & GYNECOLOGY

## 2023-12-21 PROCEDURE — 1159F MED LIST DOCD IN RCRD: CPT | Mod: CPTII,S$GLB,, | Performed by: OBSTETRICS & GYNECOLOGY

## 2023-12-21 PROCEDURE — 3079F DIAST BP 80-89 MM HG: CPT | Mod: CPTII,S$GLB,, | Performed by: OBSTETRICS & GYNECOLOGY

## 2023-12-21 PROCEDURE — 3008F PR BODY MASS INDEX (BMI) DOCUMENTED: ICD-10-PCS | Mod: CPTII,S$GLB,, | Performed by: OBSTETRICS & GYNECOLOGY

## 2023-12-21 PROCEDURE — 99214 OFFICE O/P EST MOD 30 MIN: CPT | Mod: TH,S$GLB,, | Performed by: OBSTETRICS & GYNECOLOGY

## 2023-12-21 PROCEDURE — 80053 COMPREHEN METABOLIC PANEL: CPT | Performed by: OBSTETRICS & GYNECOLOGY

## 2023-12-21 PROCEDURE — 3077F PR MOST RECENT SYSTOLIC BLOOD PRESSURE >= 140 MM HG: ICD-10-PCS | Mod: CPTII,S$GLB,, | Performed by: OBSTETRICS & GYNECOLOGY

## 2023-12-21 PROCEDURE — G0379 DIRECT REFER HOSPITAL OBSERV: HCPCS

## 2023-12-21 PROCEDURE — 3044F PR MOST RECENT HEMOGLOBIN A1C LEVEL <7.0%: ICD-10-PCS | Mod: CPTII,S$GLB,, | Performed by: OBSTETRICS & GYNECOLOGY

## 2023-12-21 PROCEDURE — 3044F HG A1C LEVEL LT 7.0%: CPT | Mod: CPTII,S$GLB,, | Performed by: OBSTETRICS & GYNECOLOGY

## 2023-12-21 PROCEDURE — 84550 ASSAY OF BLOOD/URIC ACID: CPT | Performed by: OBSTETRICS & GYNECOLOGY

## 2023-12-21 PROCEDURE — G0378 HOSPITAL OBSERVATION PER HR: HCPCS

## 2023-12-21 NOTE — DISCHARGE INSTRUCTIONS
Keep scheduled appointments with Dr. Cote and Dr. Mac.  If you have any concerns or emergencies please report to the [facility] for further evaluation.

## 2023-12-21 NOTE — PROGRESS NOTES
Maternal Fetal Medicine Follow Up    Subjective     Patient ID: [id number]    Chief Complaint: high risk pregnancy followup    HPI: Ingrid Titus is a 33 y.o. female  at 33w2d gestation with Estimated Date of Delivery: 24  who is here for follow  up consultation by [facility].  She has type 2 diabetes mellitus, diagnosed about 9 years ago. She is on metformin 1000 mg BID and insulin 12 units of NPH in the morning and 8 units of Humalog in the morning 8 units of Humalog for supper and 8 units of NPH at bedtime.  She has corrective dose of Humalog for 1 unit for every 8 mg of sugar over 140.  She had normal fetal echo on 10/25/23.  She had elevated BMI at 31.7 at initial consultation visit. TSH was normal on  at 1.1 and hemoglobin A1c was down to 6.5%.  Follow-up hemoglobin A1c on 2023 was 6.3%.  On 2023, she had 24 hour urine with 177 mg protein. Because of past history of itching patient was given an order for bile acid and liver function tests that were normal on 2023 with ALT 7, AST 19, BA 1.21. Reports her itching is better.  She is of advanced maternal age and will be 33 by the EDC.  She had a quad screen that was negative, and declined additional testing. She is on low dose aspirin 81mg twice daily.     She had a labile blood pressure reading at visit on 2023 for which preeclampsia labs were ordered.  It does not appear the labs were done.       Interval history since last [facility] visit: None.. She denies any leaking fluid, vaginal bleeding, contractions, decreased fetal movement. Denies headaches, visual disturbances, or epigastric pain    Pregnancy complications include:   Patient Active Problem List   Diagnosis    Pre-existing type 2 diabetes mellitus in pregnancy in third trimester    Increased BMI complicating pregnancy in third trimester    At high risk for preeclampsia complications of intrauterine pregnancy (IUP)    Supervision of elderly multigravida in third  "trimester    Itching    Gestational hypertension, third trimester    Excessive weight gain during pregnancy in third trimester    Polyhydramnios in third trimester        No changes to medical, surgical, family, social, or obstetric history.    Medications:  Current Outpatient Medications   Medication Instructions    aspirin (ECOTRIN) 81 mg, Oral, 2 times daily    blood sugar diagnostic Strp 1 strip, Misc.(Non-Drug; Combo Route), 4 times daily    HUMULIN R REGULAR U-100 INSULN 100 unit/mL injection CHECK BLOOD SUGARS BEFORE MEALS AND AT BEDTIME: < 130 = 0 UNITS 131-180 = 4 UNITS 181-240 = 8 UNITS 241-300 = 10 UNITS 301-350 = 12 UNITS    insulin lispro (HUMALOG U-100 INSULIN) 100 unit/mL injection INJECT 8 UNITS IN AM AND 8 UNITS AT SUPPER    insulin NPH (HUMULIN N NPH U-100 INSULIN) 100 unit/mL injection Inject 12 units in the am and 8 units at night.    insulin syringe-needle U-100 1 mL 31 gauge x 5/16 Syrg Use 1 syringe to skin three times daily    JARDIANCE 10 mg, Oral, Every morning    metFORMIN (GLUCOPHAGE) 1,000 mg, Oral, 2 times daily    ONETOUCH DELICA PLUS LANCET 33 gauge Misc SMARTSIG:device Topical Twice Daily    ONETOUCH ULTRA2 METER Misc SMARTSIG:device Via Meter Twice Daily    progesterone (PROMETRIUM) 200 mg, Oral    pyridoxine (vitamin B6) (B-6) 25 mg, Oral, 3 times daily     27 mg iron- 1 mg Tab 1 tablet, Oral       Review of Systems   12 point review of systems conducted, negative except as stated in the history of present illness. See HPI for details.      Objective     Visit Vitals  BP (!) 149/89 (BP Location: Right arm, Patient Position: Sitting, BP Method: Large (Automatic))   Pulse 84   Ht 5' 4" (1.626 m)   Wt 94.3 kg (208 lb)   LMP 05/02/2023 (Exact Date)   BMI 35.70 kg/m²        Physical Exam  Vitals and nursing note reviewed.   Constitutional:       Appearance: Normal appearance.      Comments: Increased BMI   HENT:      Head: Normocephalic and atraumatic.      Nose: Nose normal. No " congestion.   Cardiovascular:      Rate and Rhythm: Normal rate.   Pulmonary:      Effort: Pulmonary effort is normal.   Skin:     Findings: No rash.      Comments: No rashes, but there are excoriations to the anterior aspects of bilateral lower extremities   Neurological:      Mental Status: She is alert and oriented to person, place, and time.   Psychiatric:         Mood and Affect: Mood normal.         Behavior: Behavior normal.         Thought Content: Thought content normal.         Judgment: Judgment normal.           ASSESSMENT/PLAN:     33 y.o.  female with IUP at 33w2d    Pre-existing type 2 diabetes mellitus in pregnancy with mild polyhydramnios.  There is normal fetal growth with an EFW of 2013 g at the 32% and the AC at the 79% on 2023.  CONI mildly elevated at 25.0.  BPP .    Risks associated with diabetes in pregnancy include higher risk for polyhydramnios, fetal macrosomia and  metabolic complications (hypoglycemia, hyperbilirubinemia, hypocalcemia, erythema).     Continue 2200 calorie ADA diet during pregnancy. It is recommended that she have 30 grams of carbohydrates with breakfast, and 60 grams with lunch and dinner. In addition, she should have three snacks in between meals with 15 grams of carbohydrates and at bedtime with 30 grams of carbohydrates.    Patient did not have a sugar log with her but prominence to call with a it.  For now,  Patient advised to continue metformin 1000 mg BID and insulin 12 units of NPH in the morning and 8 units of Humalog in the morning 8 units of Humalog for supper and 8 units of NPH at bedtime.  Continue corrective dose of Humalog for 1 unit for every 8 mg of sugar over 140.    Advised to continue to check glucose 2/day alternating times and take log to each appointment.      Low-dose aspirin as discussed.    Will plan to recheck fetal growth in 4 weeks. Recommend twice weekly fetal testing, to alternate with Primary OB until delivery.  Reviewed FKC instructions.      Increased BMI complicating pregnancy with continued excessive weight gain  Body mass index is 35.7 kg/m². With excessive weight gain from last visit, 6 lbs in 1 week , she was advised to decrease caloric intake and was reminded of the importance of avoiding excessive weight gain.  Excess weight gain would be associated with gestational hypertension, gestational diabetes and adverse  outcomes, including fetal demise in utero.    With risk factors associated with increased BMI, she is to do fetal kick counts throughout the pregnancy (after 24 weeks).    It is important to lose weight after the pregnancy is over, especially before a future pregnancy was discussed. Breastfeeding may be an important tool in reducing the postpartum weight retention. Fetal risks were discussed with short term risk of fetal/ obesity and long term risk of adolescent component of metabolic syndrome.         At high risk for preeclampsia   With her increased risk for preeclampsia, she agreed to continue asa 81 mg BID until 34 6/7 weeks, then decrease to once daily until delivery.. Preeclampsia precautions reviewed.      Advanced maternal age  Reviewed risks with AMA, including risks for PTL, FGR, anomalies not seen, aneuploidy and stillbirth at term. She previously declined amniocentesis . She is aware of need for  evaluation. She previously declined cfDNA  and already did quad screen that was negative.    Reviewed importance of FKC 3/day and prn with instructions to immediately report any decreased fetal movement.      Previous itching  Bile acids and LFTs were normal on 2023.  Her itching is improved do not suspect cholestasis.  Expectant management.      Gestational hypertension  BP Readings from Last 1 Encounters:   23 (!) 149/89   Patient has no symptoms.  Repeat blood pressure was 144/94    With a 2nd elevation in blood pressure this confirms the diagnosis of  gestational hypertension.  We will send the patient to the hospital at this time for blood pressure monitoring and preeclampsia labs with 24 hour urine.  If stable and normal labs and no evidence of severe features, she can be discharged with close outpatient follow-up.    Discussed risks with hypertension in pregnancy, including FGR, abruption and preeclampsia/eclampsia. Advised of low sodium diet avoiding any excessive weight gain.     Discussed with the patient need for twice weekly follow-up with at least weekly labs, with more frequent labs if worsening blood pressure or clinical condition.  Patient has a blood pressure machine at home and will be checking BP 2-3 times a day.  She was given instructions to come in immediately if she has decreased fetal movements, headaches, vision problems, or epigastric pain.  She is also to come in if blood pressure is over 160 systolic or 110 diastolic.      She will have twice weekly fetal testing and weekly preeclampsia labs.    Recommend repeat labs weekly till delivery that is recommended at 37 weeks gestation (37 - 37 6/7 weeks) Sooner delivery for worsening hypertension or severe preeclampsia. Preeclampsia precautions given.       Mild polyhydramnios  I have discussed with her that in the setting of mild polyhydramnios (CONI up to 30) with no anomalies seen, there is up to 1% chance of anomalies not being seen with the ultrasound as well as 1% chance of aneuploidy and 3.8% risk of abnormal microarray, as well as risk of certain infections like parvovirus, cytomegalovirus or syphilis. The latter 2 usually have other us findings on ultrasound that may warrant checking for those infections. The risk of anomalies noted at birth and not seen antenatally could be up to 10% with severe polyhydramnios (CONI >35).      The options of doing an amniocentesis to assess chromosomes were discussed.  The benefits and risks were discussed. She declined genetic amniocentesis and the need  for  evaluation was emphasized.     She was counseled on Cell free DNA understanding that it is an enhanced screening test but not a diagnostic test. It assesses risk for common aneuploidies such as Trisomy 13, 18, and 21 by evaluating cell-free fetal DNA in maternal circulation with a false positive rate less than 0.5% for Trisomy 21 and less reliable for Trisomy 13 and Trisomy 18. She already did quad screen that was negative     Polyhydramnios can occur if defective swallowing such as with micrognathia, CNS abnormalities, GI abnormalities, cardiac or other anomalies not seen and or chromosomal abnormalities, that increase  morbidities and mortality. Discussed with her that the likely cause of the polyhydramnios is her diabetes, although other causes are possible.      With moderate to severe polyhydramnios, there are significantly higher risks for adverse outcomes, including fetal demise in utero, risk of rupture membranes with cord prolapse and/or placental abruption, as well as higher risk of aneuploidy, anomalies, infant death within the first year of life, as well as higher risk of cerebral palsy later in childhood, up to 10%. However, expectant management is recommended at this time, as risk of prematurity outweigh risk of continued pregnancy, knowing that there are potential grave risks with both waiting and delivering. Patient is in agreement with waiting at this time.     With fetal anemia being a differential for polyhydramnios, a MCA doppler was done as a screening for fetal anemia. Normal MCA doppler with no evidence of and low suspicion for fetal anemia, no further MCA assessment warranted at this time.     If polyhydramnios is significantly worse or she becomes symptomatic, then amnioreduction for symptomatic relief may be done.  Amnioreduction may be associated with  delivery (within 48 hrs of procedure) in about 4% of patients, but this could be natural rate in a high risk  of  delivery setting. PROM was associated with 1% of procedures in one study, but that also may be related to the condition itself than the procedure. At this time, she has minimal symptoms from that and expectant management needs to be done.     The higher-risk  labor in this setting of polyhydramnios was discussed. She was advised to be attentive to any symptoms increase cramps, vaginal discharge, and spotting if they happen to go to be checked very early.   If no new risk factors and stable CONI, recommend delivery at 39 weeks gestation.        Follow up in about 1 week (around 2023) for [facility] follow-up, BPP.     Future Appointments   Date Time Provider Department Center   2023  8:45 AM Shabbir Mac MD [facility]   2023  8:45 AM ROOM 3, [facility]       Patient will continue taking the same dose of insulin for now because we do not have sugars to make adjustments.  She will send her sugar log to us.  She will be going next Tuesday in the hospital for an NST and will see her next Thursday in the office.  Preeclampsia warnings given.  Patient was advised to check her blood pressures.  She was sent to the hospital for an NST and blood pressure checks as well as preeclampsia labs.  If the blood pressures in the mild range NSTs fine while waiting for the lab work and the lab work comes back occasions be able to go home on limited activity with modified bedrest with preeclampsia warnings.  Discussed with Dr. Cote.      Patient was evaluated and examined by Dr. Mac. KARLEE Farr, helped in pre charting of part of note.    Components of this note were documented using voice recognition systems and are subject to errors not corrected at proofreading. Please contact the author for any clarifications.

## 2023-12-21 NOTE — H&P
[facility]                       [address] [place] [address]    PATIENT NAME:       SABRINA BEY  YOB: 1990  CSN:                [id number]   MRN:                [id number]  ADMIT DATE:         2023 09:37:00  PHYSICIAN:          Js Cote Jr, MD                        HISTORY AND PHYSICAL      HISTORY OF PRESENT ILLNESS:  The patient is a 33-year-old  4, para   1-1-1-2 with pregnancy at 33 weeks and 2 days, admitted from Dr. Shabbir Mac, to   rule out preeclampsia.  The patient had elevated blood pressures in the office.    She had no complaints.  No headaches.  No blurry vision.  No dizziness.  She   reports that she has been compliant with her diabetes medication.  The patient   also is insulin-dependent diabetic.  Her laboratory evaluation has been normal.    Fetal surveillance is also normal.    PHYSICAL EXAMINATION:  VITAL SIGNS:  Her blood pressure 147/80, rate 88, respirations 18, temperature   98.5.  HEART:  S1 and S2.  No murmurs.  LUNGS:  Clear.  ABDOMEN:  Soft, nontender.  EXTREMITIES:  Have trace lower extremity edema.  DTRs were normal.  Fetal surveillance as mentioned in the HPI.    ASSESSMENT:  Pregnancy at 33 weeks, insulin-dependent diabetes, and gestational   hypertension.    PLAN:  Admit for laboratory evaluation and blood pressure monitoring.        ______________________________  Js Cote Jr, MD    DJE/AQS  DD:  2023  Time:  10:47AM  DT:  2023  Time:  11:50AM  Job #:  [id number]      HISTORY AND PHYSICAL

## 2023-12-21 NOTE — DISCHARGE SUMMARY
[facility]                       [address] [place] [address]    PATIENT NAME:       SABRINA BEY  YOB: 1990  CSN:                [id number]   MRN:                [id number]  ADMIT DATE:         12/21/2023 09:37:00  PHYSICIAN:          Js Cote Jr, MD                          DISCHARGE SUMMARY    DATE OF DISCHARGE:  12/21/2023 10:53:00    DIAGNOSES:  Pregnancy 33 weeks, gestational hypertension.    HOSPITAL COURSE:  The patient was sent from Dr. Shabbir Mac, secondary to   elevated blood pressures in the clinic.  She had serial blood pressures and   range was normal.  The high was 148/86 with low of 117/65.  Laboratory   evaluation was normal.  The patient had fetal surveillance that was also normal.    She will be discharged home.    CONDITION ON DISCHARGE:  Stable.    DIET:  Regular.    ACTIVITY:  Pelvic rest.    MEDICATIONS:  Vitamins.  Continue with insulin.    FOLLOWUP:  With myself and Dr. Shabbir Mac as scheduled.        ______________________________  Js Cote Jr, MD    DJE/AQS  DD:  12/21/2023  Time:  10:48AM  DT:  12/21/2023  Time:  11:10AM  Job #:  [id number]      DISCHARGE SUMMARY

## 2023-12-22 DIAGNOSIS — O24.113 PRE-EXISTING TYPE 2 DIABETES MELLITUS IN PREGNANCY IN THIRD TRIMESTER: Primary | ICD-10-CM

## 2023-12-26 ENCOUNTER — HOSPITAL ENCOUNTER (OUTPATIENT)
Facility: HOSPITAL | Age: 33
Discharge: HOME | End: 2023-12-26
Attending: OBSTETRICS & GYNECOLOGY | Admitting: OBSTETRICS & GYNECOLOGY
Payer: MEDICAID

## 2023-12-26 VITALS
SYSTOLIC BLOOD PRESSURE: 135 MMHG | HEART RATE: 72 BPM | TEMPERATURE: 98 F | OXYGEN SATURATION: 98 % | DIASTOLIC BLOOD PRESSURE: 79 MMHG

## 2023-12-26 DIAGNOSIS — O13.3 GESTATIONAL HYPERTENSION, THIRD TRIMESTER: ICD-10-CM

## 2023-12-26 LAB
ALBUMIN SERPL-MCNC: 2 G/DL (ref 3.5–5)
ALBUMIN/GLOB SERPL: 0.6 RATIO (ref 1.1–2)
ALP SERPL-CCNC: 127 UNIT/L (ref 40–150)
ALT SERPL-CCNC: <5 UNIT/L (ref 0–55)
APPEARANCE UR: CLEAR
AST SERPL-CCNC: 15 UNIT/L (ref 5–34)
BACTERIA #/AREA URNS AUTO: ABNORMAL /HPF
BASOPHILS # BLD AUTO: 0.01 X10(3)/MCL
BASOPHILS NFR BLD AUTO: 0.2 %
BILIRUB SERPL-MCNC: 0.2 MG/DL
BILIRUB UR QL STRIP.AUTO: NEGATIVE
BUN SERPL-MCNC: 3.6 MG/DL (ref 7–18.7)
CALCIUM SERPL-MCNC: 8 MG/DL (ref 8.4–10.2)
CHLORIDE SERPL-SCNC: 110 MMOL/L (ref 98–107)
CO2 SERPL-SCNC: 17 MMOL/L (ref 22–29)
COLOR UR AUTO: ABNORMAL
CREAT SERPL-MCNC: 0.65 MG/DL (ref 0.55–1.02)
EOSINOPHIL # BLD AUTO: 0.1 X10(3)/MCL (ref 0–0.9)
EOSINOPHIL NFR BLD AUTO: 1.8 %
ERYTHROCYTE [DISTWIDTH] IN BLOOD BY AUTOMATED COUNT: 12.1 % (ref 11.5–17)
GFR SERPLBLD CREATININE-BSD FMLA CKD-EPI: >60 MLS/MIN/1.73/M2
GLOBULIN SER-MCNC: 3.1 GM/DL (ref 2.4–3.5)
GLUCOSE SERPL-MCNC: 141 MG/DL (ref 74–100)
GLUCOSE UR QL STRIP.AUTO: ABNORMAL
HCT VFR BLD AUTO: 32.6 % (ref 37–47)
HGB BLD-MCNC: 10.4 G/DL (ref 12–16)
IMM GRANULOCYTES # BLD AUTO: 0.02 X10(3)/MCL (ref 0–0.04)
IMM GRANULOCYTES NFR BLD AUTO: 0.4 %
KETONES UR QL STRIP.AUTO: NEGATIVE
LDH SERPL-CCNC: 120 U/L (ref 125–220)
LEUKOCYTE ESTERASE UR QL STRIP.AUTO: NEGATIVE
LYMPHOCYTES # BLD AUTO: 1.37 X10(3)/MCL (ref 0.6–4.6)
LYMPHOCYTES NFR BLD AUTO: 24.6 %
MCH RBC QN AUTO: 27.7 PG (ref 27–31)
MCHC RBC AUTO-ENTMCNC: 31.9 G/DL (ref 33–36)
MCV RBC AUTO: 86.7 FL (ref 80–94)
MONOCYTES # BLD AUTO: 0.4 X10(3)/MCL (ref 0.1–1.3)
MONOCYTES NFR BLD AUTO: 7.2 %
MUCOUS THREADS URNS QL MICRO: ABNORMAL /LPF
NEUTROPHILS # BLD AUTO: 3.67 X10(3)/MCL (ref 2.1–9.2)
NEUTROPHILS NFR BLD AUTO: 65.8 %
NITRITE UR QL STRIP.AUTO: NEGATIVE
NRBC BLD AUTO-RTO: 0 %
PH UR STRIP.AUTO: 6 [PH]
PLATELET # BLD AUTO: 174 X10(3)/MCL (ref 130–400)
PMV BLD AUTO: 11.9 FL (ref 7.4–10.4)
POTASSIUM SERPL-SCNC: 3.7 MMOL/L (ref 3.5–5.1)
PROT SERPL-MCNC: 5.1 GM/DL (ref 6.4–8.3)
PROT UR QL STRIP.AUTO: ABNORMAL
RBC # BLD AUTO: 3.76 X10(6)/MCL (ref 4.2–5.4)
RBC #/AREA URNS AUTO: ABNORMAL /HPF
RBC UR QL AUTO: NEGATIVE
SODIUM SERPL-SCNC: 135 MMOL/L (ref 136–145)
SP GR UR STRIP.AUTO: 1.01 (ref 1–1.03)
SQUAMOUS #/AREA URNS LPF: ABNORMAL /HPF
URATE SERPL-MCNC: 4.5 MG/DL (ref 2.6–6)
UROBILINOGEN UR STRIP-ACNC: NORMAL
WBC # SPEC AUTO: 5.57 X10(3)/MCL (ref 4.5–11.5)
WBC #/AREA URNS AUTO: ABNORMAL /HPF

## 2023-12-26 PROCEDURE — 83615 LACTATE (LD) (LDH) ENZYME: CPT | Performed by: OBSTETRICS & GYNECOLOGY

## 2023-12-26 PROCEDURE — 80053 COMPREHEN METABOLIC PANEL: CPT | Performed by: OBSTETRICS & GYNECOLOGY

## 2023-12-26 PROCEDURE — 85025 COMPLETE CBC W/AUTO DIFF WBC: CPT | Performed by: OBSTETRICS & GYNECOLOGY

## 2023-12-26 PROCEDURE — 84550 ASSAY OF BLOOD/URIC ACID: CPT | Performed by: OBSTETRICS & GYNECOLOGY

## 2023-12-26 PROCEDURE — 81001 URINALYSIS AUTO W/SCOPE: CPT | Performed by: OBSTETRICS & GYNECOLOGY

## 2023-12-26 PROCEDURE — 59025 FETAL NON-STRESS TEST: CPT

## 2023-12-26 PROCEDURE — G0378 HOSPITAL OBSERVATION PER HR: HCPCS

## 2023-12-26 NOTE — DISCHARGE INSTRUCTIONS
Keep regularly scheduled appointments. Return to hospital for vaginal bleeding, leaking fluid, regular contraction, decreased fetal movement.      Return to hospital if SBP is >160 or DBP >105.

## 2023-12-27 PROBLEM — O99.013 ANEMIA DURING PREGNANCY IN THIRD TRIMESTER: Status: ACTIVE | Noted: 2023-12-27

## 2023-12-27 PROBLEM — L29.9 ITCHING: Status: RESOLVED | Noted: 2023-10-19 | Resolved: 2023-12-27

## 2023-12-27 PROBLEM — O16.9 HYPERTENSION AFFECTING PREGNANCY: Status: RESOLVED | Noted: 2023-12-21 | Resolved: 2023-12-27

## 2023-12-27 NOTE — PROGRESS NOTES
Maternal Fetal Medicine Follow Up    Subjective     Patient ID: [id number]    Chief Complaint: high risk pregnancy followup    HPI: Ingrid Titus is a 33 y.o. female  at 34w2d gestation with Estimated Date of Delivery: 24  who is here for follow  up consultation by MFM.  She has type 2 diabetes mellitus, diagnosed about 9 years ago. She is on metformin 1000 mg BID and insulin 12 units of NPH in the morning and 8 units of Humalog in the morning 8 units of Humalog for supper and 8 units of NPH at bedtime.  She has corrective dose of Humalog for 1 unit for every 8 mg of sugar over 140.  She had normal fetal echo on 10/25/23.  She had elevated BMI at 31.7 at initial consultation visit. TSH was normal on  at 1.1 and hemoglobin A1c was down to 6.5%.  Follow-up hemoglobin A1c on 2023 was 6.3%.  On 2023, she had 24 hour urine with 177 mg protein. Because of past history of itching patient was given an order for bile acid and liver function tests that were normal on 2023 with ALT 7, AST 19, BA 1.21. Reports her itching is resolved.  She is of advanced maternal age and will be 33 by the EDC.  She had a quad screen that was negative, and declined additional testing.   She was noted to have polyhydramnios at last visit on 2023.   She was diagnosed with gestational hypertension with recurrent elevated blood pressure reading on 2023.  She was sent to the hospital for evaluation and had reassuring preeclampsia labs and stable blood pressures.  Low-dose aspirin was discontinued with confirmed gestational hypertension.  Most recent preeclampsia labs on 2023 were reassuring with platelets 174, creatinine 0.65, uric acid 4.5, AST 15, ALT less than 5, .  Labs also showed mild anemia on 2023 with H&H 10.4/32.6.      Interval history since last [facility] visit: None.. She denies any leaking fluid, vaginal bleeding, contractions, decreased fetal movement. Denies  headaches, visual disturbances, or epigastric pain    Pregnancy complications include:   Patient Active Problem List   Diagnosis    Pre-existing type 2 diabetes mellitus in pregnancy in third trimester    Increased BMI complicating pregnancy in third trimester    At high risk for preeclampsia complications of intrauterine pregnancy (IUP)    Supervision of elderly multigravida in third trimester    Gestational hypertension, third trimester    Excessive weight gain during pregnancy in third trimester    Polyhydramnios in third trimester    Anemia during pregnancy in third trimester        No changes to medical, surgical, family, social, or obstetric history.    Medications:  Current Outpatient Medications   Medication Instructions    aspirin (ECOTRIN) 81 mg, Oral, 2 times daily    blood sugar diagnostic Strp 1 strip, Misc.(Non-Drug; Combo Route), 4 times daily    HUMULIN R REGULAR U-100 INSULN 100 unit/mL injection CHECK BLOOD SUGARS BEFORE MEALS AND AT BEDTIME: < 130 = 0 UNITS 131-180 = 4 UNITS 181-240 = 8 UNITS 241-300 = 10 UNITS 301-350 = 12 UNITS    insulin lispro (HUMALOG U-100 INSULIN) 100 unit/mL injection INJECT 8 UNITS IN AM AND 8 UNITS AT SUPPER    insulin NPH (HUMULIN N NPH U-100 INSULIN) 100 unit/mL injection Inject 12 units in the am and 8 units at night.    insulin syringe-needle U-100 1 mL 31 gauge x 5/16 Syrg Use 1 syringe to skin three times daily    metFORMIN (GLUCOPHAGE) 1,000 mg, Oral, 2 times daily    ONETOUCH DELICA PLUS LANCET 33 gauge Misc SMARTSIG:device Topical Twice Daily    ONETOUCH ULTRA2 METER Misc SMARTSIG:device Via Meter Twice Daily    pyridoxine (vitamin B6) (B-6) 25 mg, Oral, 3 times daily     27 mg iron- 1 mg Tab 1 tablet, Oral       Review of Systems   12 point review of systems conducted, negative except as stated in the history of present illness. See HPI for details.      Objective     Visit Vitals  BP (!) 149/83 (BP Location: Left arm, Patient Position: Sitting, BP  "Method: Large (Automatic))   Pulse 78   Ht 5' 4" (1.626 m)   Wt 95.3 kg (210 lb)   LMP 2023 (Exact Date)   BMI 36.05 kg/m²        Physical Exam  Vitals and nursing note reviewed.   Constitutional:       Appearance: Normal appearance.      Comments: Increased BMI   HENT:      Head: Normocephalic and atraumatic.      Nose: Nose normal. No congestion.   Cardiovascular:      Rate and Rhythm: Normal rate.   Pulmonary:      Effort: Pulmonary effort is normal.   Skin:     Findings: No rash.      Comments: No rashes, but there are excoriations to the anterior aspects of bilateral lower extremities   Neurological:      Mental Status: She is alert and oriented to person, place, and time.   Psychiatric:         Mood and Affect: Mood normal.         Behavior: Behavior normal.         Thought Content: Thought content normal.         Judgment: Judgment normal.           ASSESSMENT/PLAN:     33 y.o.  female with IUP at 34w2d    Pre-existing type 2 diabetes mellitus in pregnancy with mild polyhydramnios  There is normal fetal growth with an EFW of 2013 g at the 32% and the AC at the 79% on 2023.  CONI normal at 22.3 cm.  BPP 8/.    Risks associated with diabetes in pregnancy include higher risk for polyhydramnios, fetal macrosomia and  metabolic complications (hypoglycemia, hyperbilirubinemia, hypocalcemia, erythema).     Continue 2200 calorie ADA diet during pregnancy. It is recommended that she have 30 grams of carbohydrates with breakfast, and 60 grams with lunch and dinner. In addition, she should have three snacks in between meals with 15 grams of carbohydrates and at bedtime with 30 grams of carbohydrates.    Log reviewed.  Patient advised to continue metformin 1000 mg BID and insulin 12 units of NPH in the morning and 8 units of Humalog in the morning 8 units of Humalog for supper and 8 units of NPH at bedtime.  Continue corrective dose of Humalog for 1 unit for every 8 mg of sugar over " 140.    Advised to continue to check glucose 2/day alternating times and take log to each appointment.      Low-dose aspirin as discussed.    Will plan to recheck fetal growth every 4 weeks.  We will continue twice weekly fetal testing, to alternate with Primary OB until delivery. Reviewed [facility] instructions.      Increased BMI complicating pregnancy with continued excessive weight gain  Body mass index is 36.05 kg/m². With  2 lb gain since last visit, she was advised to decrease caloric intake and was reminded of the importance of avoiding excessive weight gain.  Excess weight gain would be associated with gestational hypertension, gestational diabetes and adverse  outcomes, including fetal demise in utero.    With risk factors associated with increased BMI, she is to do fetal kick counts throughout the pregnancy (after 24 weeks).    It is important to lose weight after the pregnancy is over, especially before a future pregnancy was discussed. Breastfeeding may be an important tool in reducing the postpartum weight retention. Fetal risks were discussed with short term risk of fetal/ obesity and long term risk of adolescent component of metabolic syndrome.       Advanced maternal age  Reviewed risks with AMA, including risks for PTL, FGR, anomalies not seen, aneuploidy and stillbirth at term. She previously declined amniocentesis . She is aware of need for  evaluation. She previously declined cfDNA  and already did quad screen that was negative.    Reviewed importance of FKC 3/day and prn with instructions to immediately report any decreased fetal movement.      Gestational hypertension  Discussed risks with hypertension in pregnancy, including FGR, abruption and preeclampsia/eclampsia. Advised of low sodium diet avoiding any excessive weight gain.     BP Readings from Last 1 Encounters:   23 (!) 149/83     She is asymptomatic.    Reviewed with the patient need for twice weekly follow-up  with at least weekly labs, with more frequent labs if worsening blood pressure or clinical condition.  Patient has a blood pressure machine at home and will be checking BP 2-3 times a day.  She was given instructions to come in immediately if she has decreased fetal movements, headaches, vision problems, or epigastric pain.  She is also to come in if blood pressure is over 160 systolic or 110 diastolic.      On 23, preeclampsia labs were reassuring.  We will continue doing weekly preeclampsia labs and twice weekly fetal testing until delivery.     With gestational hypertension, delivery is recommended at 37 weeks gestation (37 - 37 6/7 weeks) as risks of prematurity are outweighed by risks of continued pregnancy.  Earlier delivery maybe needed for worsening hypertension or severe preeclampsia. Preeclampsia precautions given.        Mild polyhydramnios, improved  She declined genetic amniocentesis and the need for  evaluation was emphasized. She already did quad screen that was negative     With fetal anemia being a differential for polyhydramnios, a MCA doppler was done as a screening for fetal anemia. Normal MCA doppler with no evidence of and low suspicion for fetal anemia, no further MCA assessment warranted at this time.     The higher-risk  labor in this setting of polyhydramnios was discussed. She was advised to be attentive to any symptoms increase cramps, vaginal discharge, and spotting if they happen to go to be checked very early.     Blood pressure is in mild elevated range with no signs or symptoms of severe features.  Preeclampsia labs 2 days ago were normal.  Will repeat them again early next week.  Continue twice weekly fetal testing earlier in the week with Dr. Cote or at the hospital as we will be the case next week and will see her next Thursday here.  Order for repeat labs given.  2 lb weight difference discussed.  Preeclampsia warnings.  Blood sugars are well-controlled with  no adjustment done on dose of insulin.       Follow up in about 1 week (around 1/4/2024) for MFM follow-up, BPP.     No future appointments.    Patient was evaluated and examined by Dr. Mac. KARLEE Farr, helped in pre charting of part of note.    Components of this note were documented using voice recognition systems and are subject to errors not corrected at proofreading. Please contact the author for any clarifications.

## 2023-12-28 ENCOUNTER — PATIENT MESSAGE (OUTPATIENT)
Dept: MATERNAL FETAL MEDICINE | Facility: CLINIC | Age: 33
End: 2023-12-28

## 2023-12-28 ENCOUNTER — OFFICE VISIT (OUTPATIENT)
Dept: MATERNAL FETAL MEDICINE | Facility: CLINIC | Age: 33
End: 2023-12-28
Payer: MEDICAID

## 2023-12-28 ENCOUNTER — PROCEDURE VISIT (OUTPATIENT)
Dept: MATERNAL FETAL MEDICINE | Facility: CLINIC | Age: 33
End: 2023-12-28
Payer: MEDICAID

## 2023-12-28 VITALS
DIASTOLIC BLOOD PRESSURE: 83 MMHG | HEART RATE: 78 BPM | BODY MASS INDEX: 35.85 KG/M2 | SYSTOLIC BLOOD PRESSURE: 149 MMHG | HEIGHT: 64 IN | WEIGHT: 210 LBS

## 2023-12-28 DIAGNOSIS — O24.113 PRE-EXISTING TYPE 2 DIABETES MELLITUS IN PREGNANCY IN THIRD TRIMESTER: ICD-10-CM

## 2023-12-28 DIAGNOSIS — O99.213 OBESITY AFFECTING PREGNANCY IN THIRD TRIMESTER, UNSPECIFIED OBESITY TYPE: ICD-10-CM

## 2023-12-28 DIAGNOSIS — O40.3XX0 POLYHYDRAMNIOS IN THIRD TRIMESTER COMPLICATION, SINGLE OR UNSPECIFIED FETUS: ICD-10-CM

## 2023-12-28 DIAGNOSIS — O13.3 GESTATIONAL HYPERTENSION, THIRD TRIMESTER: ICD-10-CM

## 2023-12-28 DIAGNOSIS — O26.03 EXCESSIVE WEIGHT GAIN DURING PREGNANCY IN THIRD TRIMESTER: ICD-10-CM

## 2023-12-28 DIAGNOSIS — O99.013 ANEMIA DURING PREGNANCY IN THIRD TRIMESTER: ICD-10-CM

## 2023-12-28 DIAGNOSIS — O09.523 SUPERVISION OF ELDERLY MULTIGRAVIDA IN THIRD TRIMESTER: ICD-10-CM

## 2023-12-28 DIAGNOSIS — O09.90 AT HIGH RISK FOR COMPLICATIONS OF INTRAUTERINE PREGNANCY (IUP): Primary | ICD-10-CM

## 2023-12-28 PROCEDURE — 99214 OFFICE O/P EST MOD 30 MIN: CPT | Mod: TH,S$GLB,, | Performed by: OBSTETRICS & GYNECOLOGY

## 2023-12-28 PROCEDURE — 3077F SYST BP >= 140 MM HG: CPT | Mod: CPTII,S$GLB,, | Performed by: OBSTETRICS & GYNECOLOGY

## 2023-12-28 PROCEDURE — 3044F PR MOST RECENT HEMOGLOBIN A1C LEVEL <7.0%: ICD-10-PCS | Mod: CPTII,S$GLB,, | Performed by: OBSTETRICS & GYNECOLOGY

## 2023-12-28 PROCEDURE — 3077F PR MOST RECENT SYSTOLIC BLOOD PRESSURE >= 140 MM HG: ICD-10-PCS | Mod: CPTII,S$GLB,, | Performed by: OBSTETRICS & GYNECOLOGY

## 2023-12-28 PROCEDURE — 76819 FETAL BIOPHYS PROFIL W/O NST: CPT | Mod: S$GLB,,, | Performed by: OBSTETRICS & GYNECOLOGY

## 2023-12-28 PROCEDURE — 3044F HG A1C LEVEL LT 7.0%: CPT | Mod: CPTII,S$GLB,, | Performed by: OBSTETRICS & GYNECOLOGY

## 2023-12-28 PROCEDURE — 76819 PR US, OB, FETAL BIOPHYSICAL, W/O NST: ICD-10-PCS | Mod: S$GLB,,, | Performed by: OBSTETRICS & GYNECOLOGY

## 2023-12-28 PROCEDURE — 3008F BODY MASS INDEX DOCD: CPT | Mod: CPTII,S$GLB,, | Performed by: OBSTETRICS & GYNECOLOGY

## 2023-12-28 PROCEDURE — 99214 PR OFFICE/OUTPT VISIT, EST, LEVL IV, 30-39 MIN: ICD-10-PCS | Mod: TH,S$GLB,, | Performed by: OBSTETRICS & GYNECOLOGY

## 2023-12-28 PROCEDURE — 3079F PR MOST RECENT DIASTOLIC BLOOD PRESSURE 80-89 MM HG: ICD-10-PCS | Mod: CPTII,S$GLB,, | Performed by: OBSTETRICS & GYNECOLOGY

## 2023-12-28 PROCEDURE — 1159F PR MEDICATION LIST DOCUMENTED IN MEDICAL RECORD: ICD-10-PCS | Mod: CPTII,S$GLB,, | Performed by: OBSTETRICS & GYNECOLOGY

## 2023-12-28 PROCEDURE — 3008F PR BODY MASS INDEX (BMI) DOCUMENTED: ICD-10-PCS | Mod: CPTII,S$GLB,, | Performed by: OBSTETRICS & GYNECOLOGY

## 2023-12-28 PROCEDURE — 3079F DIAST BP 80-89 MM HG: CPT | Mod: CPTII,S$GLB,, | Performed by: OBSTETRICS & GYNECOLOGY

## 2023-12-28 PROCEDURE — 1159F MED LIST DOCD IN RCRD: CPT | Mod: CPTII,S$GLB,, | Performed by: OBSTETRICS & GYNECOLOGY

## 2024-01-02 ENCOUNTER — HOSPITAL ENCOUNTER (OUTPATIENT)
Facility: HOSPITAL | Age: 34
Discharge: HOME | End: 2024-01-02
Attending: OBSTETRICS & GYNECOLOGY | Admitting: OBSTETRICS & GYNECOLOGY
Payer: MEDICAID

## 2024-01-02 VITALS
HEART RATE: 95 BPM | DIASTOLIC BLOOD PRESSURE: 77 MMHG | SYSTOLIC BLOOD PRESSURE: 158 MMHG | TEMPERATURE: 98 F | WEIGHT: 210 LBS | BODY MASS INDEX: 35.85 KG/M2 | HEIGHT: 64 IN | RESPIRATION RATE: 18 BRPM | OXYGEN SATURATION: 100 %

## 2024-01-02 DIAGNOSIS — O16.9 HYPERTENSION AFFECTING PREGNANCY: ICD-10-CM

## 2024-01-02 PROCEDURE — 59025 FETAL NON-STRESS TEST: CPT

## 2024-01-03 DIAGNOSIS — O24.113 PRE-EXISTING TYPE 2 DIABETES MELLITUS IN PREGNANCY IN THIRD TRIMESTER: Primary | ICD-10-CM

## 2024-01-04 ENCOUNTER — HOSPITAL ENCOUNTER (INPATIENT)
Facility: HOSPITAL | Age: 34
LOS: 4 days | Discharge: HOME | End: 2024-01-08
Attending: OBSTETRICS & GYNECOLOGY | Admitting: OBSTETRICS & GYNECOLOGY
Payer: MEDICAID

## 2024-01-04 ENCOUNTER — ANESTHESIA EVENT (OUTPATIENT)
Dept: OBSTETRICS AND GYNECOLOGY | Facility: HOSPITAL | Age: 34
End: 2024-01-04
Payer: MEDICAID

## 2024-01-04 ENCOUNTER — ANESTHESIA (OUTPATIENT)
Dept: OBSTETRICS AND GYNECOLOGY | Facility: HOSPITAL | Age: 34
End: 2024-01-04
Payer: MEDICAID

## 2024-01-04 ENCOUNTER — PROCEDURE VISIT (OUTPATIENT)
Dept: MATERNAL FETAL MEDICINE | Facility: CLINIC | Age: 34
End: 2024-01-04
Payer: MEDICAID

## 2024-01-04 ENCOUNTER — ANESTHESIA (OUTPATIENT)
Dept: OBSTETRICS AND GYNECOLOGY | Facility: HOSPITAL | Age: 34
End: 2024-01-04

## 2024-01-04 ENCOUNTER — OFFICE VISIT (OUTPATIENT)
Dept: MATERNAL FETAL MEDICINE | Facility: CLINIC | Age: 34
End: 2024-01-04
Payer: MEDICAID

## 2024-01-04 ENCOUNTER — ANESTHESIA EVENT (OUTPATIENT)
Dept: OBSTETRICS AND GYNECOLOGY | Facility: HOSPITAL | Age: 34
End: 2024-01-04

## 2024-01-04 VITALS
HEIGHT: 64 IN | BODY MASS INDEX: 36.67 KG/M2 | WEIGHT: 214.81 LBS | DIASTOLIC BLOOD PRESSURE: 87 MMHG | SYSTOLIC BLOOD PRESSURE: 144 MMHG | HEART RATE: 81 BPM

## 2024-01-04 DIAGNOSIS — O99.013 ANEMIA DURING PREGNANCY IN THIRD TRIMESTER: ICD-10-CM

## 2024-01-04 DIAGNOSIS — O40.3XX0 POLYHYDRAMNIOS IN THIRD TRIMESTER COMPLICATION, SINGLE OR UNSPECIFIED FETUS: ICD-10-CM

## 2024-01-04 DIAGNOSIS — O09.90 AT HIGH RISK FOR COMPLICATIONS OF INTRAUTERINE PREGNANCY (IUP): Primary | ICD-10-CM

## 2024-01-04 DIAGNOSIS — O26.03 EXCESSIVE WEIGHT GAIN DURING PREGNANCY IN THIRD TRIMESTER: ICD-10-CM

## 2024-01-04 DIAGNOSIS — O24.113 PRE-EXISTING TYPE 2 DIABETES MELLITUS IN PREGNANCY IN THIRD TRIMESTER: ICD-10-CM

## 2024-01-04 DIAGNOSIS — O09.523 SUPERVISION OF ELDERLY MULTIGRAVIDA IN THIRD TRIMESTER: ICD-10-CM

## 2024-01-04 DIAGNOSIS — I10 HYPERTENSION: Primary | ICD-10-CM

## 2024-01-04 DIAGNOSIS — O99.213 OBESITY AFFECTING PREGNANCY IN THIRD TRIMESTER, UNSPECIFIED OBESITY TYPE: ICD-10-CM

## 2024-01-04 DIAGNOSIS — O13.3 GESTATIONAL HYPERTENSION, THIRD TRIMESTER: ICD-10-CM

## 2024-01-04 LAB
ALBUMIN SERPL-MCNC: 1.9 G/DL (ref 3.5–5)
ALBUMIN SERPL-MCNC: 2 G/DL (ref 3.5–5)
ALBUMIN/GLOB SERPL: 0.5 RATIO (ref 1.1–2)
ALBUMIN/GLOB SERPL: 0.6 RATIO (ref 1.1–2)
ALP SERPL-CCNC: 149 UNIT/L (ref 40–150)
ALP SERPL-CCNC: 153 UNIT/L (ref 40–150)
ALT SERPL-CCNC: <5 UNIT/L (ref 0–55)
ALT SERPL-CCNC: <5 UNIT/L (ref 0–55)
AST SERPL-CCNC: 15 UNIT/L (ref 5–34)
AST SERPL-CCNC: 18 UNIT/L (ref 5–34)
BASOPHILS # BLD AUTO: 0.02 X10(3)/MCL
BASOPHILS # BLD AUTO: 0.02 X10(3)/MCL
BASOPHILS NFR BLD AUTO: 0.2 %
BASOPHILS NFR BLD AUTO: 0.3 %
BILIRUB SERPL-MCNC: 0.2 MG/DL
BILIRUB SERPL-MCNC: 0.2 MG/DL
BUN SERPL-MCNC: 5.4 MG/DL (ref 7–18.7)
BUN SERPL-MCNC: 5.9 MG/DL (ref 7–18.7)
CALCIUM SERPL-MCNC: 8.1 MG/DL (ref 8.4–10.2)
CALCIUM SERPL-MCNC: 8.2 MG/DL (ref 8.4–10.2)
CHLORIDE SERPL-SCNC: 108 MMOL/L (ref 98–107)
CHLORIDE SERPL-SCNC: 110 MMOL/L (ref 98–107)
CO2 SERPL-SCNC: 16 MMOL/L (ref 22–29)
CO2 SERPL-SCNC: 20 MMOL/L (ref 22–29)
CREAT SERPL-MCNC: 0.71 MG/DL (ref 0.55–1.02)
CREAT SERPL-MCNC: 0.74 MG/DL (ref 0.55–1.02)
EOSINOPHIL # BLD AUTO: 0.03 X10(3)/MCL (ref 0–0.9)
EOSINOPHIL # BLD AUTO: 0.03 X10(3)/MCL (ref 0–0.9)
EOSINOPHIL NFR BLD AUTO: 0.3 %
EOSINOPHIL NFR BLD AUTO: 0.4 %
ERYTHROCYTE [DISTWIDTH] IN BLOOD BY AUTOMATED COUNT: 11.9 % (ref 11.5–17)
ERYTHROCYTE [DISTWIDTH] IN BLOOD BY AUTOMATED COUNT: 12.2 % (ref 11.5–17)
GFR SERPLBLD CREATININE-BSD FMLA CKD-EPI: >60 MLS/MIN/1.73/M2
GFR SERPLBLD CREATININE-BSD FMLA CKD-EPI: >60 MLS/MIN/1.73/M2
GLOBULIN SER-MCNC: 3.4 GM/DL (ref 2.4–3.5)
GLOBULIN SER-MCNC: 3.8 GM/DL (ref 2.4–3.5)
GLUCOSE SERPL-MCNC: 71 MG/DL (ref 74–100)
GLUCOSE SERPL-MCNC: 84 MG/DL (ref 74–100)
GROUP & RH: NORMAL
HCT VFR BLD AUTO: 32.7 % (ref 37–47)
HCT VFR BLD AUTO: 36.8 % (ref 37–47)
HGB BLD-MCNC: 10.5 G/DL (ref 12–16)
HGB BLD-MCNC: 11.8 G/DL (ref 12–16)
IMM GRANULOCYTES # BLD AUTO: 0.03 X10(3)/MCL (ref 0–0.04)
IMM GRANULOCYTES # BLD AUTO: 0.04 X10(3)/MCL (ref 0–0.04)
IMM GRANULOCYTES NFR BLD AUTO: 0.4 %
IMM GRANULOCYTES NFR BLD AUTO: 0.4 %
INDIRECT COOMBS: NORMAL
LDH SERPL-CCNC: 154 U/L (ref 125–220)
LYMPHOCYTES # BLD AUTO: 1.43 X10(3)/MCL (ref 0.6–4.6)
LYMPHOCYTES # BLD AUTO: 1.48 X10(3)/MCL (ref 0.6–4.6)
LYMPHOCYTES NFR BLD AUTO: 14.5 %
LYMPHOCYTES NFR BLD AUTO: 21.5 %
MCH RBC QN AUTO: 27.4 PG (ref 27–31)
MCH RBC QN AUTO: 27.5 PG (ref 27–31)
MCHC RBC AUTO-ENTMCNC: 32.1 G/DL (ref 33–36)
MCHC RBC AUTO-ENTMCNC: 32.1 G/DL (ref 33–36)
MCV RBC AUTO: 85.4 FL (ref 80–94)
MCV RBC AUTO: 85.6 FL (ref 80–94)
MONOCYTES # BLD AUTO: 0.43 X10(3)/MCL (ref 0.1–1.3)
MONOCYTES # BLD AUTO: 0.52 X10(3)/MCL (ref 0.1–1.3)
MONOCYTES NFR BLD AUTO: 4.4 %
MONOCYTES NFR BLD AUTO: 7.5 %
NEUTROPHILS # BLD AUTO: 4.81 X10(3)/MCL (ref 2.1–9.2)
NEUTROPHILS # BLD AUTO: 7.89 X10(3)/MCL (ref 2.1–9.2)
NEUTROPHILS NFR BLD AUTO: 69.9 %
NEUTROPHILS NFR BLD AUTO: 80.2 %
NRBC BLD AUTO-RTO: 0 %
NRBC BLD AUTO-RTO: 0 %
PLATELET # BLD AUTO: 171 X10(3)/MCL (ref 130–400)
PLATELET # BLD AUTO: 197 X10(3)/MCL (ref 130–400)
PMV BLD AUTO: 12 FL (ref 7.4–10.4)
PMV BLD AUTO: 12.5 FL (ref 7.4–10.4)
POTASSIUM SERPL-SCNC: 4.3 MMOL/L (ref 3.5–5.1)
POTASSIUM SERPL-SCNC: 4.4 MMOL/L (ref 3.5–5.1)
PROT SERPL-MCNC: 5.3 GM/DL (ref 6.4–8.3)
PROT SERPL-MCNC: 5.8 GM/DL (ref 6.4–8.3)
RBC # BLD AUTO: 3.82 X10(6)/MCL (ref 4.2–5.4)
RBC # BLD AUTO: 4.31 X10(6)/MCL (ref 4.2–5.4)
SODIUM SERPL-SCNC: 134 MMOL/L (ref 136–145)
SODIUM SERPL-SCNC: 137 MMOL/L (ref 136–145)
SPECIMEN OUTDATE: NORMAL
URATE SERPL-MCNC: 5.5 MG/DL (ref 2.6–6)
WBC # SPEC AUTO: 6.89 X10(3)/MCL (ref 4.5–11.5)
WBC # SPEC AUTO: 9.84 X10(3)/MCL (ref 4.5–11.5)

## 2024-01-04 PROCEDURE — 37000009 HC ANESTHESIA EA ADD 15 MINS: Mod: SZN

## 2024-01-04 PROCEDURE — 25000003 PHARM REV CODE 250: Performed by: OBSTETRICS & GYNECOLOGY

## 2024-01-04 PROCEDURE — 27000492 HC SLEEVE, SCD T/L

## 2024-01-04 PROCEDURE — 59514 CESAREAN DELIVERY ONLY: CPT | Mod: QX,CRNA,, | Performed by: NURSE ANESTHETIST, CERTIFIED REGISTERED

## 2024-01-04 PROCEDURE — 63600175 PHARM REV CODE 636 W HCPCS: Mod: JZ,JG

## 2024-01-04 PROCEDURE — 80053 COMPREHEN METABOLIC PANEL: CPT | Performed by: OBSTETRICS & GYNECOLOGY

## 2024-01-04 PROCEDURE — 76819 FETAL BIOPHYS PROFIL W/O NST: CPT | Mod: S$GLB,,, | Performed by: OBSTETRICS & GYNECOLOGY

## 2024-01-04 PROCEDURE — 86850 RBC ANTIBODY SCREEN: CPT | Performed by: OBSTETRICS & GYNECOLOGY

## 2024-01-04 PROCEDURE — 11000001 HC ACUTE MED/SURG PRIVATE ROOM

## 2024-01-04 PROCEDURE — 85025 COMPLETE CBC W/AUTO DIFF WBC: CPT | Performed by: OBSTETRICS & GYNECOLOGY

## 2024-01-04 PROCEDURE — 71000033 HC RECOVERY, INTIAL HOUR: Performed by: OBSTETRICS & GYNECOLOGY

## 2024-01-04 PROCEDURE — 51702 INSERT TEMP BLADDER CATH: CPT

## 2024-01-04 PROCEDURE — 83615 LACTATE (LD) (LDH) ENZYME: CPT | Performed by: OBSTETRICS & GYNECOLOGY

## 2024-01-04 PROCEDURE — 25000003 PHARM REV CODE 250: Performed by: NURSE ANESTHETIST, CERTIFIED REGISTERED

## 2024-01-04 PROCEDURE — 36004724 HC OB OR TIME LEV III - 1ST 15 MIN: Performed by: OBSTETRICS & GYNECOLOGY

## 2024-01-04 PROCEDURE — 63600175 PHARM REV CODE 636 W HCPCS: Performed by: OBSTETRICS & GYNECOLOGY

## 2024-01-04 PROCEDURE — 63600175 PHARM REV CODE 636 W HCPCS: Mod: JZ,JG | Performed by: ANESTHESIOLOGY

## 2024-01-04 PROCEDURE — 25000003 PHARM REV CODE 250: Performed by: STUDENT IN AN ORGANIZED HEALTH CARE EDUCATION/TRAINING PROGRAM

## 2024-01-04 PROCEDURE — 99214 OFFICE O/P EST MOD 30 MIN: CPT | Mod: TH,S$GLB,, | Performed by: OBSTETRICS & GYNECOLOGY

## 2024-01-04 PROCEDURE — 3079F DIAST BP 80-89 MM HG: CPT | Mod: CPTII,S$GLB,, | Performed by: OBSTETRICS & GYNECOLOGY

## 2024-01-04 PROCEDURE — 3077F SYST BP >= 140 MM HG: CPT | Mod: CPTII,S$GLB,, | Performed by: OBSTETRICS & GYNECOLOGY

## 2024-01-04 PROCEDURE — 27200918 HC ALEXIS O RING

## 2024-01-04 PROCEDURE — 36004725 HC OB OR TIME LEV III - EA ADD 15 MIN: Performed by: OBSTETRICS & GYNECOLOGY

## 2024-01-04 PROCEDURE — 86780 TREPONEMA PALLIDUM: CPT | Performed by: OBSTETRICS & GYNECOLOGY

## 2024-01-04 PROCEDURE — 63600175 PHARM REV CODE 636 W HCPCS: Performed by: NURSE ANESTHETIST, CERTIFIED REGISTERED

## 2024-01-04 PROCEDURE — 3008F BODY MASS INDEX DOCD: CPT | Mod: CPTII,S$GLB,, | Performed by: OBSTETRICS & GYNECOLOGY

## 2024-01-04 PROCEDURE — 37000009 HC ANESTHESIA EA ADD 15 MINS: Performed by: OBSTETRICS & GYNECOLOGY

## 2024-01-04 PROCEDURE — 37000008 HC ANESTHESIA 1ST 15 MINUTES: Performed by: OBSTETRICS & GYNECOLOGY

## 2024-01-04 PROCEDURE — 59514 CESAREAN DELIVERY ONLY: CPT | Mod: QY,ANES,, | Performed by: ANESTHESIOLOGY

## 2024-01-04 PROCEDURE — 1159F MED LIST DOCD IN RCRD: CPT | Mod: CPTII,S$GLB,, | Performed by: OBSTETRICS & GYNECOLOGY

## 2024-01-04 PROCEDURE — 88302 TISSUE EXAM BY PATHOLOGIST: CPT | Performed by: OBSTETRICS & GYNECOLOGY

## 2024-01-04 PROCEDURE — 99900059 HC C-SECTION ATTEND (STAT)

## 2024-01-04 PROCEDURE — 99900035 HC TECH TIME PER 15 MIN (STAT)

## 2024-01-04 PROCEDURE — 36004725 HC OB OR TIME LEV III - EA ADD 15 MIN: Mod: SZN

## 2024-01-04 PROCEDURE — 0UB70ZZ EXCISION OF BILATERAL FALLOPIAN TUBES, OPEN APPROACH: ICD-10-PCS | Performed by: OBSTETRICS & GYNECOLOGY

## 2024-01-04 PROCEDURE — 63600175 PHARM REV CODE 636 W HCPCS: Performed by: ANESTHESIOLOGY

## 2024-01-04 PROCEDURE — 84550 ASSAY OF BLOOD/URIC ACID: CPT | Performed by: OBSTETRICS & GYNECOLOGY

## 2024-01-04 PROCEDURE — 62270 DX LMBR SPI PNXR: CPT | Performed by: ANESTHESIOLOGY

## 2024-01-04 RX ORDER — PRENATAL WITH FERROUS FUM AND FOLIC ACID 3080; 920; 120; 400; 22; 1.84; 3; 20; 10; 1; 12; 200; 27; 25; 2 [IU]/1; [IU]/1; MG/1; [IU]/1; MG/1; MG/1; MG/1; MG/1; MG/1; MG/1; UG/1; MG/1; MG/1; MG/1; MG/1
1 TABLET ORAL DAILY
Status: DISCONTINUED | OUTPATIENT
Start: 2024-01-04 | End: 2024-01-08 | Stop reason: HOSPADM

## 2024-01-04 RX ORDER — MISOPROSTOL 100 UG/1
800 TABLET ORAL ONCE AS NEEDED
Status: DISCONTINUED | OUTPATIENT
Start: 2024-01-04 | End: 2024-01-08 | Stop reason: HOSPADM

## 2024-01-04 RX ORDER — OXYTOCIN/RINGER'S LACTATE 30/500 ML
95 PLASTIC BAG, INJECTION (ML) INTRAVENOUS ONCE AS NEEDED
Status: DISCONTINUED | OUTPATIENT
Start: 2024-01-04 | End: 2024-01-08 | Stop reason: HOSPADM

## 2024-01-04 RX ORDER — LABETALOL HCL 20 MG/4 ML
20 SYRINGE (ML) INTRAVENOUS ONCE AS NEEDED
Status: COMPLETED | OUTPATIENT
Start: 2024-01-04 | End: 2024-01-04

## 2024-01-04 RX ORDER — INSULIN ASPART 100 [IU]/ML
8 INJECTION, SOLUTION INTRAVENOUS; SUBCUTANEOUS 2 TIMES DAILY WITH MEALS
Status: DISCONTINUED | OUTPATIENT
Start: 2024-01-05 | End: 2024-01-05

## 2024-01-04 RX ORDER — ACETAMINOPHEN 10 MG/ML
INJECTION, SOLUTION INTRAVENOUS
Status: DISCONTINUED | OUTPATIENT
Start: 2024-01-04 | End: 2024-01-04

## 2024-01-04 RX ORDER — MUPIROCIN 20 MG/G
OINTMENT TOPICAL 2 TIMES DAILY
Status: DISCONTINUED | OUTPATIENT
Start: 2024-01-04 | End: 2024-01-08 | Stop reason: HOSPADM

## 2024-01-04 RX ORDER — LABETALOL HCL 20 MG/4 ML
80 SYRINGE (ML) INTRAVENOUS ONCE AS NEEDED
Status: COMPLETED | OUTPATIENT
Start: 2024-01-04 | End: 2024-01-04

## 2024-01-04 RX ORDER — CARBOPROST TROMETHAMINE 250 UG/ML
250 INJECTION, SOLUTION INTRAMUSCULAR
Status: DISCONTINUED | OUTPATIENT
Start: 2024-01-04 | End: 2024-01-08 | Stop reason: HOSPADM

## 2024-01-04 RX ORDER — LIDOCAINE HYDROCHLORIDE 10 MG/ML
1 INJECTION, SOLUTION EPIDURAL; INFILTRATION; INTRACAUDAL; PERINEURAL ONCE
Status: DISCONTINUED | OUTPATIENT
Start: 2024-01-04 | End: 2024-01-08 | Stop reason: HOSPADM

## 2024-01-04 RX ORDER — EPHEDRINE SULFATE 50 MG/ML
10 INJECTION, SOLUTION INTRAVENOUS ONCE AS NEEDED
Status: DISCONTINUED | OUTPATIENT
Start: 2024-01-04 | End: 2024-01-08 | Stop reason: HOSPADM

## 2024-01-04 RX ORDER — SODIUM CITRATE AND CITRIC ACID MONOHYDRATE 334; 500 MG/5ML; MG/5ML
30 SOLUTION ORAL ONCE
Status: DISCONTINUED | OUTPATIENT
Start: 2024-01-04 | End: 2024-01-08 | Stop reason: HOSPADM

## 2024-01-04 RX ORDER — SODIUM CHLORIDE 0.9 % (FLUSH) 0.9 %
10 SYRINGE (ML) INJECTION
Status: DISCONTINUED | OUTPATIENT
Start: 2024-01-04 | End: 2024-01-08 | Stop reason: HOSPADM

## 2024-01-04 RX ORDER — MISOPROSTOL 100 UG/1
800 TABLET ORAL
Status: DISCONTINUED | OUTPATIENT
Start: 2024-01-04 | End: 2024-01-08 | Stop reason: HOSPADM

## 2024-01-04 RX ORDER — CEFAZOLIN SODIUM 1 G/3ML
INJECTION, POWDER, FOR SOLUTION INTRAMUSCULAR; INTRAVENOUS
Status: DISCONTINUED | OUTPATIENT
Start: 2024-01-04 | End: 2024-01-04

## 2024-01-04 RX ORDER — OXYTOCIN/RINGER'S LACTATE 30/500 ML
334 PLASTIC BAG, INJECTION (ML) INTRAVENOUS ONCE AS NEEDED
Status: DISCONTINUED | OUTPATIENT
Start: 2024-01-04 | End: 2024-01-08 | Stop reason: HOSPADM

## 2024-01-04 RX ORDER — MORPHINE SULFATE 0.5 MG/ML
INJECTION, SOLUTION EPIDURAL; INTRATHECAL; INTRAVENOUS
Status: DISCONTINUED | OUTPATIENT
Start: 2024-01-04 | End: 2024-01-04

## 2024-01-04 RX ORDER — EPHEDRINE SULFATE 50 MG/ML
INJECTION, SOLUTION INTRAVENOUS
Status: DISCONTINUED | OUTPATIENT
Start: 2024-01-04 | End: 2024-01-04

## 2024-01-04 RX ORDER — SODIUM CHLORIDE, SODIUM LACTATE, POTASSIUM CHLORIDE, CALCIUM CHLORIDE 600; 310; 30; 20 MG/100ML; MG/100ML; MG/100ML; MG/100ML
INJECTION, SOLUTION INTRAVENOUS CONTINUOUS
Status: DISCONTINUED | OUTPATIENT
Start: 2024-01-04 | End: 2024-01-08 | Stop reason: HOSPADM

## 2024-01-04 RX ORDER — OXYTOCIN/RINGER'S LACTATE 30/500 ML
95 PLASTIC BAG, INJECTION (ML) INTRAVENOUS ONCE
Status: DISCONTINUED | OUTPATIENT
Start: 2024-01-04 | End: 2024-01-08 | Stop reason: HOSPADM

## 2024-01-04 RX ORDER — MUPIROCIN 20 MG/G
OINTMENT TOPICAL
Status: DISCONTINUED | OUTPATIENT
Start: 2024-01-04 | End: 2024-01-08 | Stop reason: HOSPADM

## 2024-01-04 RX ORDER — FENTANYL CITRATE 50 UG/ML
INJECTION, SOLUTION INTRAMUSCULAR; INTRAVENOUS
Status: DISCONTINUED | OUTPATIENT
Start: 2024-01-04 | End: 2024-01-04

## 2024-01-04 RX ORDER — ONDANSETRON 2 MG/ML
4 INJECTION INTRAMUSCULAR; INTRAVENOUS DAILY PRN
Status: DISCONTINUED | OUTPATIENT
Start: 2024-01-04 | End: 2024-01-08 | Stop reason: HOSPADM

## 2024-01-04 RX ORDER — LABETALOL HCL 20 MG/4 ML
40 SYRINGE (ML) INTRAVENOUS ONCE AS NEEDED
Status: COMPLETED | OUTPATIENT
Start: 2024-01-04 | End: 2024-01-04

## 2024-01-04 RX ORDER — FACIAL-BODY WIPES
10 EACH TOPICAL ONCE AS NEEDED
Status: DISCONTINUED | OUTPATIENT
Start: 2024-01-04 | End: 2024-01-08 | Stop reason: HOSPADM

## 2024-01-04 RX ORDER — SODIUM CHLORIDE, SODIUM LACTATE, POTASSIUM CHLORIDE, CALCIUM CHLORIDE 600; 310; 30; 20 MG/100ML; MG/100ML; MG/100ML; MG/100ML
1000 INJECTION, SOLUTION INTRAVENOUS CONTINUOUS
Status: DISCONTINUED | OUTPATIENT
Start: 2024-01-04 | End: 2024-01-04

## 2024-01-04 RX ORDER — METHYLERGONOVINE MALEATE 0.2 MG/ML
200 INJECTION INTRAVENOUS ONCE AS NEEDED
Status: DISCONTINUED | OUTPATIENT
Start: 2024-01-04 | End: 2024-01-08 | Stop reason: HOSPADM

## 2024-01-04 RX ORDER — SODIUM CITRATE AND CITRIC ACID MONOHYDRATE 334; 500 MG/5ML; MG/5ML
30 SOLUTION ORAL
Status: DISCONTINUED | OUTPATIENT
Start: 2024-01-04 | End: 2024-01-08 | Stop reason: HOSPADM

## 2024-01-04 RX ORDER — OXYCODONE AND ACETAMINOPHEN 10; 325 MG/1; MG/1
1 TABLET ORAL EVERY 4 HOURS PRN
Status: DISCONTINUED | OUTPATIENT
Start: 2024-01-04 | End: 2024-01-08 | Stop reason: HOSPADM

## 2024-01-04 RX ORDER — NALBUPHINE HYDROCHLORIDE 10 MG/ML
2.5 INJECTION, SOLUTION INTRAMUSCULAR; INTRAVENOUS; SUBCUTANEOUS ONCE
Status: DISCONTINUED | OUTPATIENT
Start: 2024-01-04 | End: 2024-01-04

## 2024-01-04 RX ORDER — ADHESIVE BANDAGE
30 BANDAGE TOPICAL 2 TIMES DAILY PRN
Status: DISCONTINUED | OUTPATIENT
Start: 2024-01-05 | End: 2024-01-08 | Stop reason: HOSPADM

## 2024-01-04 RX ORDER — HYDROMORPHONE HYDROCHLORIDE 2 MG/ML
0.2 INJECTION, SOLUTION INTRAMUSCULAR; INTRAVENOUS; SUBCUTANEOUS EVERY 5 MIN PRN
Status: DISCONTINUED | OUTPATIENT
Start: 2024-01-04 | End: 2024-01-08 | Stop reason: HOSPADM

## 2024-01-04 RX ORDER — KETOROLAC TROMETHAMINE 30 MG/ML
INJECTION, SOLUTION INTRAMUSCULAR; INTRAVENOUS
Status: DISCONTINUED | OUTPATIENT
Start: 2024-01-04 | End: 2024-01-04

## 2024-01-04 RX ORDER — OXYTOCIN/RINGER'S LACTATE 30/500 ML
PLASTIC BAG, INJECTION (ML) INTRAVENOUS CONTINUOUS PRN
Status: DISCONTINUED | OUTPATIENT
Start: 2024-01-04 | End: 2024-01-04

## 2024-01-04 RX ORDER — LABETALOL HCL 20 MG/4 ML
80 SYRINGE (ML) INTRAVENOUS ONCE AS NEEDED
Status: DISCONTINUED | OUTPATIENT
Start: 2024-01-04 | End: 2024-01-04

## 2024-01-04 RX ORDER — PHENYLEPHRINE HYDROCHLORIDE 10 MG/ML
INJECTION INTRAVENOUS
Status: DISCONTINUED | OUTPATIENT
Start: 2024-01-04 | End: 2024-01-04

## 2024-01-04 RX ORDER — OXYTOCIN/RINGER'S LACTATE 30/500 ML
334 PLASTIC BAG, INJECTION (ML) INTRAVENOUS ONCE
Status: DISCONTINUED | OUTPATIENT
Start: 2024-01-04 | End: 2024-01-08 | Stop reason: HOSPADM

## 2024-01-04 RX ORDER — LABETALOL HCL 20 MG/4 ML
20 SYRINGE (ML) INTRAVENOUS ONCE AS NEEDED
Status: DISCONTINUED | OUTPATIENT
Start: 2024-01-04 | End: 2024-01-04

## 2024-01-04 RX ORDER — HYDRALAZINE HYDROCHLORIDE 20 MG/ML
10 INJECTION INTRAMUSCULAR; INTRAVENOUS ONCE AS NEEDED
Status: DISCONTINUED | OUTPATIENT
Start: 2024-01-04 | End: 2024-01-04

## 2024-01-04 RX ORDER — ASCORBIC ACID 250 MG
250 TABLET ORAL 2 TIMES DAILY
Qty: 60 TABLET | Refills: 3 | Status: SHIPPED | OUTPATIENT
Start: 2024-01-04

## 2024-01-04 RX ORDER — DIPHENHYDRAMINE HCL 25 MG
25 CAPSULE ORAL EVERY 4 HOURS PRN
Status: DISCONTINUED | OUTPATIENT
Start: 2024-01-04 | End: 2024-01-08 | Stop reason: HOSPADM

## 2024-01-04 RX ORDER — FOLIC ACID 1 MG/1
1 TABLET ORAL DAILY
Qty: 30 TABLET | Refills: 3 | Status: SHIPPED | OUTPATIENT
Start: 2024-01-04 | End: 2024-05-03

## 2024-01-04 RX ORDER — MAGNESIUM SULFATE HEPTAHYDRATE 40 MG/ML
2 INJECTION, SOLUTION INTRAVENOUS CONTINUOUS
Status: DISCONTINUED | OUTPATIENT
Start: 2024-01-04 | End: 2024-01-08 | Stop reason: HOSPADM

## 2024-01-04 RX ORDER — ONDANSETRON 2 MG/ML
INJECTION INTRAMUSCULAR; INTRAVENOUS
Status: DISCONTINUED | OUTPATIENT
Start: 2024-01-04 | End: 2024-01-04

## 2024-01-04 RX ORDER — PROCHLORPERAZINE EDISYLATE 5 MG/ML
5 INJECTION INTRAMUSCULAR; INTRAVENOUS EVERY 6 HOURS PRN
Status: DISCONTINUED | OUTPATIENT
Start: 2024-01-04 | End: 2024-01-08 | Stop reason: HOSPADM

## 2024-01-04 RX ORDER — SODIUM CHLORIDE, SODIUM GLUCONATE, SODIUM ACETATE, POTASSIUM CHLORIDE AND MAGNESIUM CHLORIDE 30; 37; 368; 526; 502 MG/100ML; MG/100ML; MG/100ML; MG/100ML; MG/100ML
INJECTION, SOLUTION INTRAVENOUS CONTINUOUS
Status: DISCONTINUED | OUTPATIENT
Start: 2024-01-04 | End: 2024-01-08 | Stop reason: HOSPADM

## 2024-01-04 RX ORDER — LABETALOL HCL 20 MG/4 ML
SYRINGE (ML) INTRAVENOUS
Status: COMPLETED
Start: 2024-01-04 | End: 2024-01-04

## 2024-01-04 RX ORDER — MAGNESIUM SULFATE HEPTAHYDRATE 40 MG/ML
4 INJECTION, SOLUTION INTRAVENOUS ONCE
Status: DISCONTINUED | OUTPATIENT
Start: 2024-01-04 | End: 2024-01-08 | Stop reason: HOSPADM

## 2024-01-04 RX ORDER — LABETALOL HCL 20 MG/4 ML
40 SYRINGE (ML) INTRAVENOUS ONCE AS NEEDED
Status: DISCONTINUED | OUTPATIENT
Start: 2024-01-04 | End: 2024-01-04

## 2024-01-04 RX ORDER — LABETALOL 200 MG/1
200 TABLET, FILM COATED ORAL EVERY 12 HOURS
Status: DISCONTINUED | OUTPATIENT
Start: 2024-01-04 | End: 2024-01-05

## 2024-01-04 RX ORDER — SIMETHICONE 80 MG
1 TABLET,CHEWABLE ORAL EVERY 6 HOURS PRN
Status: DISCONTINUED | OUTPATIENT
Start: 2024-01-04 | End: 2024-01-08 | Stop reason: HOSPADM

## 2024-01-04 RX ORDER — AMOXICILLIN 250 MG
1 CAPSULE ORAL NIGHTLY PRN
Status: DISCONTINUED | OUTPATIENT
Start: 2024-01-04 | End: 2024-01-08 | Stop reason: HOSPADM

## 2024-01-04 RX ORDER — DOCUSATE SODIUM 100 MG/1
200 CAPSULE, LIQUID FILLED ORAL 2 TIMES DAILY
Status: DISCONTINUED | OUTPATIENT
Start: 2024-01-04 | End: 2024-01-08 | Stop reason: HOSPADM

## 2024-01-04 RX ORDER — OXYCODONE AND ACETAMINOPHEN 5; 325 MG/1; MG/1
1 TABLET ORAL EVERY 4 HOURS PRN
Status: DISCONTINUED | OUTPATIENT
Start: 2024-01-04 | End: 2024-01-08 | Stop reason: HOSPADM

## 2024-01-04 RX ORDER — OXYTOCIN 10 [USP'U]/ML
10 INJECTION, SOLUTION INTRAMUSCULAR; INTRAVENOUS ONCE AS NEEDED
Status: DISCONTINUED | OUTPATIENT
Start: 2024-01-04 | End: 2024-01-08 | Stop reason: HOSPADM

## 2024-01-04 RX ORDER — FERROUS SULFATE 325(65) MG
325 TABLET ORAL 2 TIMES DAILY
Qty: 60 TABLET | Refills: 3 | Status: SHIPPED | OUTPATIENT
Start: 2024-01-04

## 2024-01-04 RX ORDER — MAGNESIUM SULFATE HEPTAHYDRATE 40 MG/ML
4 INJECTION, SOLUTION INTRAVENOUS ONCE
Status: COMPLETED | OUTPATIENT
Start: 2024-01-04 | End: 2024-01-04

## 2024-01-04 RX ORDER — HYDRALAZINE HYDROCHLORIDE 20 MG/ML
10 INJECTION INTRAMUSCULAR; INTRAVENOUS ONCE AS NEEDED
Status: COMPLETED | OUTPATIENT
Start: 2024-01-04 | End: 2024-01-04

## 2024-01-04 RX ORDER — METHYLERGONOVINE MALEATE 0.2 MG/ML
200 INJECTION INTRAVENOUS
Status: DISCONTINUED | OUTPATIENT
Start: 2024-01-04 | End: 2024-01-08 | Stop reason: HOSPADM

## 2024-01-04 RX ORDER — NALOXONE HCL 0.4 MG/ML
0.4 VIAL (ML) INJECTION SEE ADMIN INSTRUCTIONS
Status: DISCONTINUED | OUTPATIENT
Start: 2024-01-04 | End: 2024-01-08 | Stop reason: HOSPADM

## 2024-01-04 RX ORDER — DIPHENHYDRAMINE HYDROCHLORIDE 50 MG/ML
25 INJECTION, SOLUTION INTRAMUSCULAR; INTRAVENOUS EVERY 6 HOURS PRN
Status: DISCONTINUED | OUTPATIENT
Start: 2024-01-04 | End: 2024-01-08 | Stop reason: HOSPADM

## 2024-01-04 RX ORDER — FAMOTIDINE 10 MG/ML
20 INJECTION INTRAVENOUS ONCE
Status: DISCONTINUED | OUTPATIENT
Start: 2024-01-04 | End: 2024-01-08 | Stop reason: HOSPADM

## 2024-01-04 RX ORDER — ACETAMINOPHEN 325 MG/1
975 TABLET ORAL EVERY 8 HOURS
Status: DISPENSED | OUTPATIENT
Start: 2024-01-04 | End: 2024-01-07

## 2024-01-04 RX ORDER — CALCIUM GLUCONATE 98 MG/ML
1 INJECTION, SOLUTION INTRAVENOUS
Status: DISCONTINUED | OUTPATIENT
Start: 2024-01-04 | End: 2024-01-08 | Stop reason: HOSPADM

## 2024-01-04 RX ORDER — DIPHENOXYLATE HYDROCHLORIDE AND ATROPINE SULFATE 2.5; .025 MG/1; MG/1
2 TABLET ORAL EVERY 6 HOURS PRN
Status: DISCONTINUED | OUTPATIENT
Start: 2024-01-04 | End: 2024-01-08 | Stop reason: HOSPADM

## 2024-01-04 RX ORDER — BUPIVACAINE HYDROCHLORIDE 7.5 MG/ML
INJECTION, SOLUTION EPIDURAL; RETROBULBAR
Status: COMPLETED | OUTPATIENT
Start: 2024-01-04 | End: 2024-01-04

## 2024-01-04 RX ORDER — ONDANSETRON 4 MG/1
8 TABLET, ORALLY DISINTEGRATING ORAL EVERY 8 HOURS PRN
Status: DISCONTINUED | OUTPATIENT
Start: 2024-01-04 | End: 2024-01-08 | Stop reason: HOSPADM

## 2024-01-04 RX ORDER — FAMOTIDINE 10 MG/ML
20 INJECTION INTRAVENOUS
Status: DISCONTINUED | OUTPATIENT
Start: 2024-01-04 | End: 2024-01-08 | Stop reason: HOSPADM

## 2024-01-04 RX ADMIN — DIPHENHYDRAMINE HYDROCHLORIDE 25 MG: 50 INJECTION INTRAMUSCULAR; INTRAVENOUS at 07:01

## 2024-01-04 RX ADMIN — FENTANYL CITRATE 10 MCG: 50 INJECTION, SOLUTION INTRAMUSCULAR; INTRAVENOUS at 04:01

## 2024-01-04 RX ADMIN — KETOROLAC TROMETHAMINE 30 MG: 30 INJECTION, SOLUTION INTRAMUSCULAR; INTRAVENOUS at 04:01

## 2024-01-04 RX ADMIN — BUPIVACAINE HYDROCHLORIDE 1.8 ML: 7.5 INJECTION, SOLUTION EPIDURAL; RETROBULBAR at 04:01

## 2024-01-04 RX ADMIN — LABETALOL HYDROCHLORIDE 80 MG: 5 INJECTION, SOLUTION INTRAVENOUS at 03:01

## 2024-01-04 RX ADMIN — HYDRALAZINE HYDROCHLORIDE 10 MG: 20 INJECTION INTRAMUSCULAR; INTRAVENOUS at 09:01

## 2024-01-04 RX ADMIN — SODIUM CHLORIDE, POTASSIUM CHLORIDE, SODIUM LACTATE AND CALCIUM CHLORIDE: 600; 310; 30; 20 INJECTION, SOLUTION INTRAVENOUS at 04:01

## 2024-01-04 RX ADMIN — ACETAMINOPHEN 975 MG: 325 TABLET, FILM COATED ORAL at 10:01

## 2024-01-04 RX ADMIN — SODIUM CHLORIDE, POTASSIUM CHLORIDE, SODIUM LACTATE AND CALCIUM CHLORIDE: 600; 310; 30; 20 INJECTION, SOLUTION INTRAVENOUS at 07:01

## 2024-01-04 RX ADMIN — ONDANSETRON 4 MG: 2 INJECTION INTRAMUSCULAR; INTRAVENOUS at 04:01

## 2024-01-04 RX ADMIN — LABETALOL HYDROCHLORIDE 80 MG: 5 INJECTION, SOLUTION INTRAVENOUS at 08:01

## 2024-01-04 RX ADMIN — LABETALOL HYDROCHLORIDE 200 MG: 200 TABLET, FILM COATED ORAL at 10:01

## 2024-01-04 RX ADMIN — EPHEDRINE SULFATE 25 MG: 50 INJECTION, SOLUTION INTRAVENOUS at 04:01

## 2024-01-04 RX ADMIN — ACETAMINOPHEN 1000 MG: 10 INJECTION, SOLUTION INTRAVENOUS at 04:01

## 2024-01-04 RX ADMIN — Medication 95 ML/HR: at 04:01

## 2024-01-04 RX ADMIN — LABETALOL HYDROCHLORIDE 20 MG: 5 INJECTION, SOLUTION INTRAVENOUS at 02:01

## 2024-01-04 RX ADMIN — EPHEDRINE SULFATE 25 MG: 50 INJECTION INTRAVENOUS at 04:01

## 2024-01-04 RX ADMIN — MAGNESIUM SULFATE HEPTAHYDRATE 4 G: 40 INJECTION, SOLUTION INTRAVENOUS at 03:01

## 2024-01-04 RX ADMIN — FENTANYL CITRATE 90 MCG: 50 INJECTION, SOLUTION INTRAMUSCULAR; INTRAVENOUS at 05:01

## 2024-01-04 RX ADMIN — LABETALOL HYDROCHLORIDE 20 MG: 5 INJECTION, SOLUTION INTRAVENOUS at 07:01

## 2024-01-04 RX ADMIN — LABETALOL HYDROCHLORIDE 40 MG: 5 INJECTION, SOLUTION INTRAVENOUS at 03:01

## 2024-01-04 RX ADMIN — HYDROMORPHONE HYDROCHLORIDE 0.2 MG: 2 INJECTION INTRAMUSCULAR; INTRAVENOUS; SUBCUTANEOUS at 07:01

## 2024-01-04 RX ADMIN — CEFAZOLIN 2 G: 330 INJECTION, POWDER, FOR SOLUTION INTRAMUSCULAR; INTRAVENOUS at 04:01

## 2024-01-04 RX ADMIN — LABETALOL HYDROCHLORIDE 40 MG: 5 INJECTION, SOLUTION INTRAVENOUS at 08:01

## 2024-01-04 RX ADMIN — Medication 20 MG: at 02:01

## 2024-01-04 RX ADMIN — MORPHINE SULFATE 0.1 MG: 0.5 INJECTION, SOLUTION EPIDURAL; INTRATHECAL; INTRAVENOUS at 04:01

## 2024-01-04 RX ADMIN — PHENYLEPHRINE HYDROCHLORIDE 100 MCG: 10 INJECTION INTRAVENOUS at 04:01

## 2024-01-04 NOTE — TRANSFER OF CARE
Anesthesia Transfer of Care Note    Patient: Ingrid Titus    Procedure(s) Performed: Procedure(s) (LRB):   SECTION, WITH TUBAL LIGATION (N/A)    Patient location: Labor and Delivery    Anesthesia Type: spinal    Transport from OR: Transported from OR on room air with adequate spontaneous ventilation    Post pain: adequate analgesia    Post assessment: no apparent anesthetic complications    Post vital signs: stable    Level of consciousness: awake, alert and oriented    Nausea/Vomiting: no nausea/vomiting    Complications: none    Transfer of care protocol was followed      Last vitals: Visit Vitals  BP (!) 148/79   Pulse 75   Temp 37 °C (98.6 °F)   Resp 18   LMP 2023 (Exact Date)   SpO2 97%   Breastfeeding No

## 2024-01-04 NOTE — H&P
[facility] - [facility]te[facility]  Obstetrics  History & Physical    Patient Name: Ingrid Titus  MRN: [id number]  Admission Date: 2024  Primary Care Provider: LOGAN Felix MD    Subjective:     Principal Problem:severe pre e sent from [facility] for rcs btl    History of Present Illness: htn pre e headache    Obstetric HPI:  Patient reports None contractions, active fetal movement, No vaginal bleeding , No loss of fluid     This pregnancy has been complicated by htn ama dm    OB History    Para Term  AB Living   4 2 1 1 1 2   SAB IAB Ectopic Multiple Live Births   1 0 0 0 2      # Outcome Date GA Lbr Kalia/2nd Weight Sex Delivery Anes PTL Lv   4 Current            3 SAB 23           2 Term 16 38w0d  3.175 kg (7 lb) F CS-Unspec Spinal N FERN   1  13 33w0d  1.814 kg (4 lb) F CS-Unspec EPI Y FERN      Birth Comments: EPIDURAL DIDN'T WORK SO SHE WAS GIVEN A 6 PACK      Complications: Preeclampsia     Past Medical History:   Diagnosis Date    Complication of anesthesia 2013    EPIDURAL DID NOT WORK FOR 1ST BABY    Diabetes mellitus     DURING 1ST PREGNANCY UNTIL PRESENT    Type 2 diabetes mellitus without complications     Unspecified pre-eclampsia, unspecified trimester      Past Surgical History:   Procedure Laterality Date     SECTION      , AND 2016       PTA Medications   Medication Sig    ascorbic acid, vitamin C, (VITAMIN C) 250 MG tablet Take 1 tablet (250 mg total) by mouth 2 (two) times daily.    aspirin (ECOTRIN) 81 MG EC tablet Take 1 tablet (81 mg total) by mouth 2 (two) times a day.    blood sugar diagnostic Strp 1 strip by Misc.(Non-Drug; Combo Route) route 4 (four) times daily.    ferrous sulfate (FEOSOL) 325 mg (65 mg iron) Tab tablet Take 1 tablet (325 mg total) by mouth 2 (two) times daily.    folic acid (FOLVITE) 1 MG tablet Take 1 tablet (1 mg total) by mouth once daily.    HUMULIN R REGULAR U-100 INSULN 100 unit/mL injection CHECK  BLOOD SUGARS BEFORE MEALS AND AT BEDTIME: < 130 = 0 UNITS 131-180 = 4 UNITS 181-240 = 8 UNITS 241-300 = 10 UNITS 301-350 = 12 UNITS    insulin lispro (HUMALOG U-100 INSULIN) 100 unit/mL injection INJECT 8 UNITS IN AM AND 8 UNITS AT SUPPER    insulin NPH (HUMULIN N NPH U-100 INSULIN) 100 unit/mL injection Inject 12 units in the am and 8 units at night.    insulin syringe-needle U-100 1 mL 31 gauge x 5/16 Syrg Use 1 syringe to skin three times daily    metFORMIN (GLUCOPHAGE) 1000 MG tablet Take 1 tablet (1,000 mg total) by mouth 2 (two) times daily.    ONETOUCH DELICA PLUS LANCET 33 gauge Misc SMARTSIG:device Topical Twice Daily    ONETOUCH ULTRA2 METER Misc SMARTSIG:device Via Meter Twice Daily    pyridoxine, vitamin B6, (B-6) 25 MG Tab Take 1 tablet (25 mg total) by mouth 3 (three) times daily.     27 mg iron- 1 mg Tab Take 1 tablet by mouth.       Review of patient's allergies indicates:  No Known Allergies     Family History       Problem Relation (Age of Onset)    Diabetes Maternal Grandmother, Maternal Grandfather, Father    Hypertension Paternal Grandmother, Father    Miscarriages / Stillbirths Maternal Grandmother, Mother    Stroke Mother          Tobacco Use    Smoking status: Former     Current packs/day: 0.00     Average packs/day: 0.5 packs/day for 4.0 years (2.0 ttl pk-yrs)     Types: Cigarettes     Start date:      Quit date:      Years since quittin.0     Passive exposure: Current    Smokeless tobacco: Never   Substance and Sexual Activity    Alcohol use: Not Currently    Drug use: Never    Sexual activity: Yes     Partners: Male     Review of Systems   Objective:     Vital Signs (Most Recent):  Pulse: 85 (24 1354)  BP: (!) 148/70 (24 1354) Vital Signs (24h Range):  Pulse:  [63-85] 85  BP: (136-170)/(67-87) 148/70        There is no height or weight on file to calculate BMI.    FHT: 150Cat 1 (reassuring)  TOCO:  Q 0 minutes    Physical Exam    Cervix:  Dilation:   0  Effacement:  0%  Station: -3  Presentation: Vertex     Significant Labs:  Lab Results   Component Value Date    GROUPTRH A POS 2023    AFP 46.9 2023       I have personallly reviewed all pertinent lab results from the last 24 hours.    Assessment/Plan:     33 y.o. female  at 35w2d for:    There are no hospital problems to display for this patient.      Admit to lnd for rcs  Mag sulfate  Ivf  Scds      Kike Cote MD  Obstetrics  [facility]

## 2024-01-04 NOTE — ANESTHESIA PROCEDURE NOTES
Spinal    Diagnosis: PIH and Gestational Diabetes  Patient location during procedure: OB  Start time: 1/4/2024 4:20 PM  Timeout: 1/4/2024 4:20 PM  End time: 1/4/2024 4:20 PM    Staffing  Authorizing Provider: Rafal Trinidad MD  Performing Provider: Rafal Trinidad MD    Staffing  Performed by: Rafal Trinidad MD  Authorized by: Rafal Trinidad MD    Preanesthetic Checklist  Completed: patient identified, IV checked, site marked, risks and benefits discussed, surgical consent, monitors and equipment checked, pre-op evaluation and timeout performed  Spinal Block  Patient position: sitting  Prep: ChloraPrep  Patient monitoring: heart rate, continuous pulse ox and frequent blood pressure checks  Approach: midline  Location: L2-3  Injection technique: lumbar puncture  CSF Fluid: clear free-flowing CSF  Needle  Needle type: pencil-tip   Needle gauge: 25 G  Needle length: 3.5 in  Additional Documentation: incremental injection, negative aspiration for heme and no paresthesia on injection  Needle localization: anatomical landmarks  Assessment  Sensory level: T4   Dermatomal levels determined by alcohol wipe  Ease of block: easy  Patient's tolerance of the procedure: comfortable throughout block and no complaints  Medications:    Medications: bupivacaine (pf) (MARCAINE) injection 0.75% - Intraspinal   1.8 mL - 1/4/2024 4:20:00 PM

## 2024-01-04 NOTE — PROGRESS NOTES
Maternal Fetal Medicine Follow Up    Subjective     Patient ID: [id number]    Chief Complaint: high risk pregnancy followup    HPI: Ingrid Titus is a 33 y.o. female  at 35w2d gestation with Estimated Date of Delivery: 24  who is here for follow  up consultation by MFM.  She has type 2 diabetes mellitus, diagnosed about 9 years ago. She is on metformin 1000 mg BID and insulin 12 units of NPH in the morning and 8 units of Humalog in the morning 8 units of Humalog for supper and 8 units of NPH at bedtime.  She has corrective dose of Humalog for 1 unit for every 8 mg of sugar over 140.  She did not bring her sugar log with her but reported fasting blood sugars less than 90 and 1 hour postprandials less than 140.  She had normal fetal echo on 10/25/23.  She had elevated BMI at 31.7 at initial consultation visit. TSH was normal on  at 1.1 and hemoglobin A1c was down to 6.5%.  Follow-up hemoglobin A1c on 2023 was 6.3%.  On 2023, she had 24 hour urine with 177 mg protein. Because of past history of itching patient was given an order for bile acid and liver function tests that were normal on 2023 with ALT 7, AST 19, BA 1.21. Reports her itching is resolved.  She is of advanced maternal age and will be 33 by the EDC.  She had a quad screen that was negative, and declined additional testing.   She was noted to have polyhydramnios at previous visit, improved on most recent ultrasound.   She was diagnosed with gestational hypertension with recurrent elevated blood pressure reading on 2023.  She was sent to the hospital for evaluation and had reassuring preeclampsia labs and stable blood pressures.  Low-dose aspirin was discontinued with confirmed gestational hypertension.  Most recent preeclampsia labs on 2023 were reassuring with platelets 174, creatinine 0.65, uric acid 4.5, AST 15, ALT less than 5, .  Labs also showed mild anemia on 2023 with H&H  10.4/32.6.      Interval history since last [facility] visit: None.. She denies any leaking fluid, vaginal bleeding, contractions, decreased fetal movement. Denies headaches, visual disturbances, or epigastric pain    Pregnancy complications include:   Patient Active Problem List   Diagnosis    Pre-existing type 2 diabetes mellitus in pregnancy in third trimester    Increased BMI complicating pregnancy in third trimester    At high risk for preeclampsia complications of intrauterine pregnancy (IUP)    Supervision of elderly multigravida in third trimester    Gestational hypertension, third trimester    Excessive weight gain during pregnancy in third trimester    Anemia during pregnancy in third trimester        No changes to medical, surgical, family, social, or obstetric history.    Medications:  Current Outpatient Medications   Medication Instructions    ascorbic acid (vitamin C) (VITAMIN C) 250 mg, Oral, 2 times daily    aspirin (ECOTRIN) 81 mg, Oral, 2 times daily    blood sugar diagnostic Strp 1 strip, Misc.(Non-Drug; Combo Route), 4 times daily    ferrous sulfate (FEOSOL) 325 mg, Oral, 2 times daily    folic acid (FOLVITE) 1 mg, Oral, Daily    HUMULIN R REGULAR U-100 INSULN 100 unit/mL injection CHECK BLOOD SUGARS BEFORE MEALS AND AT BEDTIME: < 130 = 0 UNITS 131-180 = 4 UNITS 181-240 = 8 UNITS 241-300 = 10 UNITS 301-350 = 12 UNITS    insulin lispro (HUMALOG U-100 INSULIN) 100 unit/mL injection INJECT 8 UNITS IN AM AND 8 UNITS AT SUPPER    insulin NPH (HUMULIN N NPH U-100 INSULIN) 100 unit/mL injection Inject 12 units in the am and 8 units at night.    insulin syringe-needle U-100 1 mL 31 gauge x 5/16 Syrg Use 1 syringe to skin three times daily    metFORMIN (GLUCOPHAGE) 1,000 mg, Oral, 2 times daily    ONETOUCH DELICA PLUS LANCET 33 gauge Misc SMARTSIG:device Topical Twice Daily    ONETOUCH ULTRA2 METER Misc SMARTSIG:device Via Meter Twice Daily    pyridoxine (vitamin B6) (B-6) 25 mg, Oral, 3 times daily      "27 mg iron- 1 mg Tab 1 tablet, Oral       Review of Systems   12 point review of systems conducted, negative except as stated in the history of present illness. See HPI for details.      Objective     Visit Vitals  BP (!) 144/87 (BP Location: Right arm, Patient Position: Sitting, BP Method: Large (Automatic))   Pulse 81   Ht 5' 4" (1.626 m)   Wt 97.4 kg (214 lb 12.8 oz)   LMP 2023 (Exact Date)   BMI 36.87 kg/m²        Physical Exam  Vitals and nursing note reviewed.   Constitutional:       Appearance: Normal appearance.      Comments: Increased BMI   HENT:      Head: Normocephalic and atraumatic.      Nose: Nose normal. No congestion.   Cardiovascular:      Rate and Rhythm: Normal rate.   Pulmonary:      Effort: Pulmonary effort is normal.   Musculoskeletal:      Comments: Trace edema bilaterally with normal reflexes.   Skin:     Findings: No rash.      Comments: No rashes, but there are excoriations to the anterior aspects of bilateral lower extremities   Neurological:      Mental Status: She is alert and oriented to person, place, and time.   Psychiatric:         Mood and Affect: Mood normal.         Behavior: Behavior normal.         Thought Content: Thought content normal.         Judgment: Judgment normal.           ASSESSMENT/PLAN:     33 y.o.  female with IUP at 35w2d    Pre-existing type 2 diabetes mellitus in pregnancy  There is normal fetal growth with an EFW of 2013 g at the 32% and the AC at the 79% on 2023.  AFV is normal.  BPP 8/8.    Risks associated with diabetes in pregnancy include higher risk for polyhydramnios, fetal macrosomia and  metabolic complications (hypoglycemia, hyperbilirubinemia, hypocalcemia, erythema).     Continue 2200 calorie ADA diet during pregnancy. It is recommended that she have 30 grams of carbohydrates with breakfast, and 60 grams with lunch and dinner. In addition, she should have three snacks in between meals with 15 grams of carbohydrates and at " bedtime with 30 grams of carbohydrates.    She did not bring her sugar log today, but reported normal blood sugars.  Values discussed.  Patient advised to continue metformin 1000 mg BID and insulin 12 units of NPH in the morning and 8 units of Humalog in the morning 8 units of Humalog for supper and 8 units of NPH at bedtime.  Continue corrective dose of Humalog for 1 unit for every 8 mg of sugar over 140.    Advised to continue to check glucose 2/day alternating times and take log to each appointment.      Low-dose aspirin as discussed.    Will plan to recheck fetal growth every 4 weeks.  We will continue twice weekly fetal testing, to alternate with Primary OB until delivery. Reviewed [facility] instructions.      Increased BMI complicating pregnancy with continued excessive weight gain  Body mass index is 36.87 kg/m². With  4 lb gain since last visit, she was advised to decrease caloric intake and was reminded of the importance of avoiding excessive weight gain.  Excess weight gain would be associated with gestational hypertension, gestational diabetes and adverse  outcomes, including fetal demise in utero.    With risk factors associated with increased BMI, she is to do fetal kick counts throughout the pregnancy (after 24 weeks).    It is important to lose weight after the pregnancy is over, especially before a future pregnancy was discussed. Breastfeeding may be an important tool in reducing the postpartum weight retention. Fetal risks were discussed with short term risk of fetal/ obesity and long term risk of adolescent component of metabolic syndrome.       Advanced maternal age  Reviewed risks with AMA, including risks for PTL, FGR, anomalies not seen, aneuploidy and stillbirth at term. She previously declined amniocentesis . She is aware of need for  evaluation. She previously declined cfDNA  and already did quad screen that was negative.    Reviewed importance of FKC 3/day and prn with  instructions to immediately report any decreased fetal movement.      Gestational hypertension  Discussed risks with hypertension in pregnancy, including FGR, abruption and preeclampsia/eclampsia. Advised of low sodium diet avoiding any excessive weight gain.     BP Readings from Last 1 Encounters:   24 (!) 144/87   She had initial reading of 155/83.  There was also an elevation noted on 2024 at 158/77.    She is asymptomatic.    Reviewed with the patient need for twice weekly follow-up with at least weekly labs, with more frequent labs if worsening blood pressure or clinical condition.  Patient has a blood pressure machine at home and will be checking BP 2-3 times a day.  She was given instructions to come in immediately if she has decreased fetal movements, headaches, vision problems, or epigastric pain.  She understands to keep checking her blood pressures at home and come in immediately if blood pressure is 160 or higher systolic, or 110 or higher diastolic     On 23, preeclampsia labs were reassuring.  She did not do her labs for this week.  With elevated blood pressure in her not doing her labs, her to go the hospital at this time for blood pressure and fetal monitoring and to do her preeclampsia labs.      With gestational hypertension, delivery is recommended at 37 weeks gestation (37 - 37 6/7 weeks) as risks of prematurity are outweighed by risks of continued pregnancy.  Earlier delivery maybe needed for worsening hypertension or severe preeclampsia. Preeclampsia precautions given.        Mild polyhydramnios, improved  She declined genetic amniocentesis and the need for  evaluation was emphasized. She already did quad screen that was negative     With fetal anemia being a differential for polyhydramnios, a MCA doppler was done as a screening for fetal anemia. Normal MCA doppler with no evidence of and low suspicion for fetal anemia, no further MCA assessment warranted at this time.      The higher-risk  labor in this setting of polyhydramnios was discussed. She was advised to be attentive to any symptoms increase cramps, vaginal discharge, and spotting if they happen to go to be checked very early.       Anemia in pregnancy  The World Health Organization (WHO) defines anemia as a hemoglobin level <11 g/dL (approximately equivalent to a hematocrit <33 percent) in the first trimester, <10.5 g/dL in the second trimester, <10.5 to 11 g/dL in the third trimester, or <10 g/dL postpartum. Anemia affects approximately 30 percent of reproductive-age females and 40 percent of pregnant individuals, mostly due to iron deficiency.       Physiologic anemia of pregnancy and iron deficiency are the two most common causes of anemia in pregnancy. Much less common causes of anemia include hemoglobinopathies (sickle cell, thalassemia), RBC membrane disorders (hereditary spherocytosis and had hereditary Elliptocytosis), and acquired anemias (folate deficiency, vitamin B12 deficiency, other vitamin deficiencies, autoimmune hemolytic anemia Anemia, hypothyroidism and chronic kidney disease). All gravidas with anemia or symptoms of anemia should have prompt testing for iron deficiency because iron deficiency can progress to anemia; iron deficiency is the most common pathologic cause of anemia in pregnancy.    I discussed with her that iron deficiency during the first two trimesters of pregnancy is associated with a 2-fold increased risk for  delivery and a 3-fold increased risk for delivering a low-birth-weight baby.    We will start the patient on oral hematinic therapy with ferrous sulfate 325 mg twice daily, vitamin-C 250 mg twice daily, and folic acid 1 mg daily.  Recommend intermittent CBC throughout pregnancy to assess response to therapy.      Follow up in about 1 week (around 2024) for MFM follow-up, Repeat ultrasound, BPP.     No future appointments.      Patient has not had preeclampsia labs  this week.  Send her the hospital do preeclampsia labs along with few blood pressure monitoring as her blood pressures are close to the severe range with a reading of 155/83 but repeated 144/87 and 2 days ago patient had blood pressure of 158/77.  She understands to keep checking her blood pressures at home and come in immediately if blood pressure is 160 or higher systolic, or 110 or higher diastolic.  Discussed with Dr. Kike Cote covering Dr. Js Cote    Patient was evaluated and examined by Dr. Mac. KARLEE Farr, helped in pre charting of part of note.    Components of this note were documented using voice recognition systems and are subject to errors not corrected at proofreading. Please contact the author for any clarifications.

## 2024-01-04 NOTE — ANESTHESIA PREPROCEDURE EVALUATION
"                                                                                                             2024  Ingrid Titus is a 33 y.o., female with IUP at 35 weeks with multiple pregnancy complications as listed below.  She is sent here from clinic for urgent C section.    "Chief Complaint: high risk pregnancy followup     HPI: Ingrid Titus is a 33 y.o. female  at 35w2d gestation with Estimated Date of Delivery: 24  who is here for follow  up consultation by [facility]EN.  She has type 2 diabetes mellitus, diagnosed about 9 years ago. She is on metformin 1000 mg BID and insulin 12 units of NPH in the morning and 8 units of Humalog in the morning 8 units of Humalog for supper and 8 units of NPH at bedtime.  She has corrective dose of Humalog for 1 unit for every 8 mg of sugar over 140.  She did not bring her sugar log with her but reported fasting blood sugars less than 90 and 1 hour postprandials less than 140.  She had normal fetal echo on 10/25/23.  She had elevated BMI at 31.7 at initial consultation visit. TSH was normal on  at 1.1 and hemoglobin A1c was down to 6.5%.  Follow-up hemoglobin A1c on 2023 was 6.3%.  On 2023, she had 24 hour urine with 177 mg protein. Because of past history of itching patient was given an order for bile acid and liver function tests that were normal on 2023 with ALT 7, AST 19, BA 1.21. Reports her itching is resolved.  She is of advanced maternal age and will be 33 by the EDC.  She had a quad screen that was negative, and declined additional testing.   She was noted to have polyhydramnios at previous visit, improved on most recent ultrasound.   She was diagnosed with gestational hypertension with recurrent elevated blood pressure reading on 2023.  She was sent to the hospital for evaluation and had reassuring preeclampsia labs and stable blood pressures.  Low-dose aspirin was discontinued with confirmed gestational " hypertension.  Most recent preeclampsia labs on 2023 were reassuring with platelets 174, creatinine 0.65, uric acid 4.5, AST 15, ALT less than 5, .  Labs also showed mild anemia on 2023 with H&H 10.4/32.6.        Interval history since last [facility] visit: None.. She denies any leaking fluid, vaginal bleeding, contractions, decreased fetal movement. Denies headaches, visual disturbances, or epigastric pain     Pregnancy complications include:       Patient Active Problem List   Diagnosis    Pre-existing type 2 diabetes mellitus in pregnancy in third trimester    Increased BMI complicating pregnancy in third trimester    At high risk for preeclampsia complications of intrauterine pregnancy (IUP)    Supervision of elderly multigravida in third trimester    Gestational hypertension, third trimester    Excessive weight gain during pregnancy in third trimester    Anemia during pregnancy in third trimester   ....    ASSESSMENT/PLAN:      33 y.o.  female with IUP at 35w2d     Pre-existing type 2 diabetes mellitus in pregnancy  There is normal fetal growth with an EFW of 2013 g at the 32% and the AC at the 79% on 2023.  AFV is normal.  BPP 8/8.     Risks associated with diabetes in pregnancy include higher risk for polyhydramnios, fetal macrosomia and  metabolic complications (hypoglycemia, hyperbilirubinemia, hypocalcemia, erythema).      Continue 2200 calorie ADA diet during pregnancy. It is recommended that she have 30 grams of carbohydrates with breakfast, and 60 grams with lunch and dinner. In addition, she should have three snacks in between meals with 15 grams of carbohydrates and at bedtime with 30 grams of carbohydrates.     She did not bring her sugar log today, but reported normal blood sugars.  Values discussed.  Patient advised to continue metformin 1000 mg BID and insulin 12 units of NPH in the morning and 8 units of Humalog in the morning 8 units of Humalog for supper and 8  units of NPH at bedtime.  Continue corrective dose of Humalog for 1 unit for every 8 mg of sugar over 140.     Advised to continue to check glucose 2/day alternating times and take log to each appointment.                 Low-dose aspirin as discussed.     Will plan to recheck fetal growth every 4 weeks.  We will continue twice weekly fetal testing, to alternate with Primary OB until delivery. Reviewed FKC instructions.        Increased BMI complicating pregnancy with continued excessive weight gain  Body mass index is 36.87 kg/m². With  4 lb gain since last visit, she was advised to decrease caloric intake and was reminded of the importance of avoiding excessive weight gain.  Excess weight gain would be associated with gestational hypertension, gestational diabetes and adverse  outcomes, including fetal demise in utero.     With risk factors associated with increased BMI, she is to do fetal kick counts throughout the pregnancy (after 24 weeks).     It is important to lose weight after the pregnancy is over, especially before a future pregnancy was discussed. Breastfeeding may be an important tool in reducing the postpartum weight retention. Fetal risks were discussed with short term risk of fetal/ obesity and long term risk of adolescent component of metabolic syndrome.         Advanced maternal age  Reviewed risks with AMA, including risks for PTL, FGR, anomalies not seen, aneuploidy and stillbirth at term. She previously declined amniocentesis . She is aware of need for  evaluation. She previously declined cfDNA  and already did quad screen that was negative.     Reviewed importance of FKC 3/day and prn with instructions to immediately report any decreased fetal movement.        Gestational hypertension  Discussed risks with hypertension in pregnancy, including FGR, abruption and preeclampsia/eclampsia. Advised of low sodium diet avoiding any excessive weight gain.          BP Readings  from Last 1 Encounters:   24 (!) 144/87   She had initial reading of 155/83.  There was also an elevation noted on 2024 at 158/77.     She is asymptomatic.     Reviewed with the patient need for twice weekly follow-up with at least weekly labs, with more frequent labs if worsening blood pressure or clinical condition.  Patient has a blood pressure machine at home and will be checking BP 2-3 times a day.  She was given instructions to come in immediately if she has decreased fetal movements, headaches, vision problems, or epigastric pain.  She understands to keep checking her blood pressures at home and come in immediately if blood pressure is 160 or higher systolic, or 110 or higher diastolic      On 23, preeclampsia labs were reassuring.  She did not do her labs for this week.  With elevated blood pressure in her not doing her labs, her to go the hospital at this time for blood pressure and fetal monitoring and to do her preeclampsia labs.       With gestational hypertension, delivery is recommended at 37 weeks gestation (37 - 37 6/7 weeks) as risks of prematurity are outweighed by risks of continued pregnancy.  Earlier delivery maybe needed for worsening hypertension or severe preeclampsia. Preeclampsia precautions given.          Mild polyhydramnios, improved  She declined genetic amniocentesis and the need for  evaluation was emphasized. She already did quad screen that was negative     With fetal anemia being a differential for polyhydramnios, a MCA doppler was done as a screening for fetal anemia. Normal MCA doppler with no evidence of and low suspicion for fetal anemia, no further MCA assessment warranted at this time.     The higher-risk  labor in this setting of polyhydramnios was discussed. She was advised to be attentive to any symptoms increase cramps, vaginal discharge, and spotting if they happen to go to be checked very early.         Anemia in pregnancy  The World  Health Organization (WHO) defines anemia as a hemoglobin level <11 g/dL (approximately equivalent to a hematocrit <33 percent) in the first trimester, <10.5 g/dL in the second trimester, <10.5 to 11 g/dL in the third trimester, or <10 g/dL postpartum. Anemia affects approximately 30 percent of reproductive-age females and 40 percent of pregnant individuals, mostly due to iron deficiency.         Physiologic anemia of pregnancy and iron deficiency are the two most common causes of anemia in pregnancy. Much less common causes of anemia include hemoglobinopathies (sickle cell, thalassemia), RBC membrane disorders (hereditary spherocytosis and had hereditary Elliptocytosis), and acquired anemias (folate deficiency, vitamin B12 deficiency, other vitamin deficiencies, autoimmune hemolytic anemia Anemia, hypothyroidism and chronic kidney disease). All gravidas with anemia or symptoms of anemia should have prompt testing for iron deficiency because iron deficiency can progress to anemia; iron deficiency is the most common pathologic cause of anemia in pregnancy.     I discussed with her that iron deficiency during the first two trimesters of pregnancy is associated with a 2-fold increased risk for  delivery and a 3-fold increased risk for delivering a low-birth-weight baby.     We will start the patient on oral hematinic therapy with ferrous sulfate 325 mg twice daily, vitamin-C 250 mg twice daily, and folic acid 1 mg daily.  Recommend intermittent CBC throughout pregnancy to assess response to therapy.      Follow up in about 1 week (around 2024) for MFM follow-up, Repeat ultrasound, BPP.      No future appointments.        Patient has not had preeclampsia labs this week.  Send her the hospital do preeclampsia labs along with few blood pressure monitoring as her blood pressures are close to the severe range with a reading of 155/83 but repeated 144/87 and 2 days ago patient had blood pressure of 158/77.  She  "understands to keep checking her blood pressures at home and come in immediately if blood pressure is 160 or higher systolic, or 110 or higher diastolic.  Discussed with Dr. Kike Cote covering Dr. Js Cote"         Pre-op Assessment    I have reviewed the Patient Summary Reports.     I have reviewed the Nursing Notes. I have reviewed the NPO Status.   I have reviewed the Medications.     Review of Systems  Anesthesia Hx:  No problems with previous Anesthesia             Denies Family Hx of Anesthesia complications.    Denies Personal Hx of Anesthesia complications.                    Cardiovascular:     Hypertension       Denies Angina.                                  Pulmonary:  Pulmonary Normal      Denies Shortness of breath.                  Endocrine:  Diabetes, type 2         Obesity / BMI > 30      Physical Exam  General: Well nourished, Cooperative, Alert and Oriented    Airway:  Mallampati: II   Mouth Opening: Normal  TM Distance: Normal  Tongue: Normal  Neck ROM: Normal ROM  Neck: Girth Increased    Dental:  Intact    Chest/Lungs:  Clear to auscultation, Normal Respiratory Rate    Heart:  Rate: Normal  Rhythm: Regular Rhythm        Anesthesia Plan  Type of Anesthesia, risks & benefits discussed:    Anesthesia Type: Spinal  Intra-op Monitoring Plan: Standard ASA Monitors  Post Op Pain Control Plan: multimodal analgesia  Informed Consent: Informed consent signed with the Patient and all parties understand the risks and agree with anesthesia plan.  All questions answered. Patient consented to blood products? Yes  ASA Score: 3 Emergent  Day of Surgery Review of History & Physical: H&P Update referred to the surgeon/provider.    Ready For Surgery From Anesthesia Perspective.     .      "

## 2024-01-05 LAB
BASOPHILS # BLD AUTO: 0.03 X10(3)/MCL
BASOPHILS NFR BLD AUTO: 0.3 %
EOSINOPHIL # BLD AUTO: 0.05 X10(3)/MCL (ref 0–0.9)
EOSINOPHIL NFR BLD AUTO: 0.5 %
ERYTHROCYTE [DISTWIDTH] IN BLOOD BY AUTOMATED COUNT: 12.1 % (ref 11.5–17)
GLUCOSE BLD-MCNC: 176 MG/DL
HCT VFR BLD AUTO: 31.1 % (ref 37–47)
HGB BLD-MCNC: 9.9 G/DL (ref 12–16)
IMM GRANULOCYTES # BLD AUTO: 0.05 X10(3)/MCL (ref 0–0.04)
IMM GRANULOCYTES NFR BLD AUTO: 0.5 %
LYMPHOCYTES # BLD AUTO: 1.78 X10(3)/MCL (ref 0.6–4.6)
LYMPHOCYTES NFR BLD AUTO: 18.6 %
MCH RBC QN AUTO: 27.3 PG (ref 27–31)
MCHC RBC AUTO-ENTMCNC: 31.8 G/DL (ref 33–36)
MCV RBC AUTO: 85.7 FL (ref 80–94)
MONOCYTES # BLD AUTO: 0.46 X10(3)/MCL (ref 0.1–1.3)
MONOCYTES NFR BLD AUTO: 4.8 %
NEUTROPHILS # BLD AUTO: 7.19 X10(3)/MCL (ref 2.1–9.2)
NEUTROPHILS NFR BLD AUTO: 75.3 %
NRBC BLD AUTO-RTO: 0 %
PLATELET # BLD AUTO: 190 X10(3)/MCL (ref 130–400)
PMV BLD AUTO: 12 FL (ref 7.4–10.4)
POCT GLUCOSE: 101 MG/DL (ref 70–110)
POCT GLUCOSE: 104 MG/DL (ref 70–110)
POCT GLUCOSE: 119 MG/DL (ref 70–110)
RBC # BLD AUTO: 3.63 X10(6)/MCL (ref 4.2–5.4)
T PALLIDUM AB SER QL: NONREACTIVE
WBC # SPEC AUTO: 9.56 X10(3)/MCL (ref 4.5–11.5)

## 2024-01-05 PROCEDURE — 25000003 PHARM REV CODE 250: Performed by: OBSTETRICS & GYNECOLOGY

## 2024-01-05 PROCEDURE — 63600175 PHARM REV CODE 636 W HCPCS: Performed by: OBSTETRICS & GYNECOLOGY

## 2024-01-05 PROCEDURE — 11000001 HC ACUTE MED/SURG PRIVATE ROOM

## 2024-01-05 PROCEDURE — 99232 SBSQ HOSP IP/OBS MODERATE 35: CPT | Mod: ,,, | Performed by: OBSTETRICS & GYNECOLOGY

## 2024-01-05 PROCEDURE — 85025 COMPLETE CBC W/AUTO DIFF WBC: CPT | Performed by: OBSTETRICS & GYNECOLOGY

## 2024-01-05 RX ORDER — LABETALOL 200 MG/1
200 TABLET, FILM COATED ORAL EVERY 8 HOURS
Status: DISCONTINUED | OUTPATIENT
Start: 2024-01-05 | End: 2024-01-06

## 2024-01-05 RX ADMIN — OXYCODONE AND ACETAMINOPHEN 1 TABLET: 10; 325 TABLET ORAL at 10:01

## 2024-01-05 RX ADMIN — DOCUSATE SODIUM 200 MG: 100 CAPSULE, LIQUID FILLED ORAL at 10:01

## 2024-01-05 RX ADMIN — DIPHENHYDRAMINE HYDROCHLORIDE 25 MG: 25 CAPSULE ORAL at 12:01

## 2024-01-05 RX ADMIN — MAGNESIUM SULFATE HEPTAHYDRATE 2 G/HR: 40 INJECTION, SOLUTION INTRAVENOUS at 09:01

## 2024-01-05 RX ADMIN — LABETALOL HYDROCHLORIDE 200 MG: 200 TABLET, FILM COATED ORAL at 09:01

## 2024-01-05 RX ADMIN — DOCUSATE SODIUM 200 MG: 100 CAPSULE, LIQUID FILLED ORAL at 09:01

## 2024-01-05 RX ADMIN — OXYCODONE AND ACETAMINOPHEN 1 TABLET: 10; 325 TABLET ORAL at 12:01

## 2024-01-05 RX ADMIN — OXYCODONE AND ACETAMINOPHEN 1 TABLET: 10; 325 TABLET ORAL at 05:01

## 2024-01-05 RX ADMIN — INSULIN ASPART 8 UNITS: 100 INJECTION, SOLUTION INTRAVENOUS; SUBCUTANEOUS at 07:01

## 2024-01-05 RX ADMIN — DIPHENHYDRAMINE HYDROCHLORIDE 25 MG: 25 CAPSULE ORAL at 10:01

## 2024-01-05 RX ADMIN — OXYCODONE AND ACETAMINOPHEN 1 TABLET: 10; 325 TABLET ORAL at 07:01

## 2024-01-05 RX ADMIN — OXYCODONE AND ACETAMINOPHEN 1 TABLET: 10; 325 TABLET ORAL at 11:01

## 2024-01-05 RX ADMIN — LABETALOL HYDROCHLORIDE 200 MG: 200 TABLET, FILM COATED ORAL at 01:01

## 2024-01-05 RX ADMIN — PRENATAL VITAMINS-IRON FUMARATE 27 MG IRON-FOLIC ACID 0.8 MG TABLET 1 TABLET: at 09:01

## 2024-01-05 RX ADMIN — INSULIN HUMAN 12 UNITS: 100 INJECTION, SUSPENSION SUBCUTANEOUS at 07:01

## 2024-01-05 RX ADMIN — SODIUM CHLORIDE, POTASSIUM CHLORIDE, SODIUM LACTATE AND CALCIUM CHLORIDE: 600; 310; 30; 20 INJECTION, SOLUTION INTRAVENOUS at 09:01

## 2024-01-05 RX ADMIN — OXYCODONE AND ACETAMINOPHEN 1 TABLET: 10; 325 TABLET ORAL at 03:01

## 2024-01-05 RX ADMIN — LABETALOL HYDROCHLORIDE 200 MG: 200 TABLET, FILM COATED ORAL at 10:01

## 2024-01-05 NOTE — NURSING
Set up nicu mom with a breast pump at this time. She stated she hand expressed earlier today with help from her L&D nurse. Educated on pump usage/frequency/troubleshooting, cleansing of parts, safe milk storage guidelines, etc. Helped her perform her first pumping session, visualized colostrum dripping from both sides. She stated she felt comfortable collecting the colostrum into a labeled syringe with the help of her visitor. Provided syringes, lanolin, gel pads, and educational booklet. Explained pumping 8+ times a day and the different options for a schedule she could go by.  States she has pumped once before for approximately one month. Explained that she had some supply issues.

## 2024-01-05 NOTE — CONSULTS
The Ingrid Titus is a 33 y.o.  female  s/p repeat LTCS with BTL, admitted to the hospital for gestational hypertension with severe features.  She was diagnosed with gestational hypertension on 2023.  She was sent to the hospital from [facility] office on 2024 due to elevated blood pressure up to 155 systolic and patient not having done weekly preeclampsia labs.  While in the hospital for labs and monitoring, she was noted to have severe range elevations in blood pressure and decision was made to proceed with delivery.  She is currently on magnesium sulfate for seizure prophylaxis and labetalol 200 mg q.12 hours.  She also has type 2 diabetes and increased BMI over 30.  She denies leaking of fluid, vaginal bleeding, contractions, or decreased fetal movement. She denies headaches, visual disturbances, or epigastric pain.        Review of Systems   12 point review of systems conducted, negative except as stated in the history of present illness. See HPI for details.    Past Medical History:   Diagnosis Date    Complication of anesthesia 2013    EPIDURAL DID NOT WORK FOR 1ST BABY    Diabetes mellitus     DURING 1ST PREGNANCY UNTIL PRESENT    Type 2 diabetes mellitus without complications     Unspecified pre-eclampsia, unspecified trimester         Past Surgical History:   Procedure Laterality Date     SECTION      , AND 2016        Social Connections: Not on file        Family History   Problem Relation Age of Onset    Hypertension Paternal Grandmother     Miscarriages / Stillbirths Maternal Grandmother     Diabetes Maternal Grandmother     Diabetes Maternal Grandfather     Hypertension Father     Diabetes Father     Stroke Mother     Miscarriages / Stillbirths Mother         Temp:  [97.4 °F (36.3 °C)-98.6 °F (37 °C)] 97.9 °F (36.6 °C)  Pulse:  [] 88  Resp:  [12-18] 16  SpO2:  [90 %-100 %] 97 %  BP: (114-188)/(56-87) 137/68    Physical Exam  Vitals and nursing note reviewed.    Constitutional:       Appearance: Normal appearance.   HENT:      Head: Normocephalic.   Pulmonary:      Effort: Pulmonary effort is normal.   Abdominal:      Palpations: Abdomen is soft.      Tenderness: There is no abdominal tenderness. There is no guarding.   Musculoskeletal:      Cervical back: Neck supple.      Right lower leg: No edema.      Left lower leg: No edema.   Skin:     General: Skin is dry.   Neurological:      Mental Status: She is alert and oriented to person, place, and time.      Deep Tendon Reflexes: Reflexes normal.   Psychiatric:         Mood and Affect: Mood normal.         Thought Content: Thought content normal.         Judgment: Judgment normal.          Recent Results (from the past 48 hour(s))   Comprehensive Metabolic Panel    Collection Time: 01/04/24 12:36 PM   Result Value Ref Range    Sodium Level 137 136 - 145 mmol/L    Potassium Level 4.3 3.5 - 5.1 mmol/L    Chloride 110 (H) 98 - 107 mmol/L    Carbon Dioxide 20 (L) 22 - 29 mmol/L    Glucose Level 84 74 - 100 mg/dL    Blood Urea Nitrogen 5.9 (L) 7.0 - 18.7 mg/dL    Creatinine 0.74 0.55 - 1.02 mg/dL    Calcium Level Total 8.1 (L) 8.4 - 10.2 mg/dL    Protein Total 5.3 (L) 6.4 - 8.3 gm/dL    Albumin Level 1.9 (L) 3.5 - 5.0 g/dL    Globulin 3.4 2.4 - 3.5 gm/dL    Albumin/Globulin Ratio 0.6 (L) 1.1 - 2.0 ratio    Bilirubin Total 0.2 <=1.5 mg/dL    Alkaline Phosphatase 149 40 - 150 unit/L    Alanine Aminotransferase <5 0 - 55 unit/L    Aspartate Aminotransferase 15 5 - 34 unit/L    eGFR >60 mls/min/1.73/m2   Lactate Dehydrogenase    Collection Time: 01/04/24 12:36 PM   Result Value Ref Range    Lactate Dehydrogenase 154 125 - 220 U/L   Uric Acid    Collection Time: 01/04/24 12:36 PM   Result Value Ref Range    Uric Acid 5.5 2.6 - 6.0 mg/dL   CBC with Differential    Collection Time: 01/04/24 12:36 PM   Result Value Ref Range    WBC 6.89 4.50 - 11.50 x10(3)/mcL    RBC 3.82 (L) 4.20 - 5.40 x10(6)/mcL    Hgb 10.5 (L) 12.0 - 16.0 g/dL     Hct 32.7 (L) 37.0 - 47.0 %    MCV 85.6 80.0 - 94.0 fL    MCH 27.5 27.0 - 31.0 pg    MCHC 32.1 (L) 33.0 - 36.0 g/dL    RDW 12.2 11.5 - 17.0 %    Platelet 171 130 - 400 x10(3)/mcL    MPV 12.0 (H) 7.4 - 10.4 fL    Neut % 69.9 %    Lymph % 21.5 %    Mono % 7.5 %    Eos % 0.4 %    Basophil % 0.3 %    Lymph # 1.48 0.6 - 4.6 x10(3)/mcL    Neut # 4.81 2.1 - 9.2 x10(3)/mcL    Mono # 0.52 0.1 - 1.3 x10(3)/mcL    Eos # 0.03 0 - 0.9 x10(3)/mcL    Baso # 0.02 <=0.2 x10(3)/mcL    IG# 0.03 0 - 0.04 x10(3)/mcL    IG% 0.4 %    NRBC% 0.0 %   Type & Screen    Collection Time: 01/04/24 12:50 PM   Result Value Ref Range    Group & Rh A POS     Indirect [place] GEL NEG     Specimen Outdate 01/07/2024 23:59    SYPHILIS ANTIBODY (WITH REFLEX RPR)    Collection Time: 01/04/24  7:02 PM   Result Value Ref Range    Syphilis Antibody Nonreactive Nonreactive, Equivocal   Comprehensive metabolic panel    Collection Time: 01/04/24  7:02 PM   Result Value Ref Range    Sodium Level 134 (L) 136 - 145 mmol/L    Potassium Level 4.4 3.5 - 5.1 mmol/L    Chloride 108 (H) 98 - 107 mmol/L    Carbon Dioxide 16 (L) 22 - 29 mmol/L    Glucose Level 71 (L) 74 - 100 mg/dL    Blood Urea Nitrogen 5.4 (L) 7.0 - 18.7 mg/dL    Creatinine 0.71 0.55 - 1.02 mg/dL    Calcium Level Total 8.2 (L) 8.4 - 10.2 mg/dL    Protein Total 5.8 (L) 6.4 - 8.3 gm/dL    Albumin Level 2.0 (L) 3.5 - 5.0 g/dL    Globulin 3.8 (H) 2.4 - 3.5 gm/dL    Albumin/Globulin Ratio 0.5 (L) 1.1 - 2.0 ratio    Bilirubin Total 0.2 <=1.5 mg/dL    Alkaline Phosphatase 153 (H) 40 - 150 unit/L    Alanine Aminotransferase <5 0 - 55 unit/L    Aspartate Aminotransferase 18 5 - 34 unit/L    eGFR >60 mls/min/1.73/m2   CBC with Differential    Collection Time: 01/04/24  7:02 PM   Result Value Ref Range    WBC 9.84 4.50 - 11.50 x10(3)/mcL    RBC 4.31 4.20 - 5.40 x10(6)/mcL    Hgb 11.8 (L) 12.0 - 16.0 g/dL    Hct 36.8 (L) 37.0 - 47.0 %    MCV 85.4 80.0 - 94.0 fL    MCH 27.4 27.0 - 31.0 pg    MCHC 32.1 (L) 33.0 - 36.0  g/dL    RDW 11.9 11.5 - 17.0 %    Platelet 197 130 - 400 x10(3)/mcL    MPV 12.5 (H) 7.4 - 10.4 fL    Neut % 80.2 %    Lymph % 14.5 %    Mono % 4.4 %    Eos % 0.3 %    Basophil % 0.2 %    Lymph # 1.43 0.6 - 4.6 x10(3)/mcL    Neut # 7.89 2.1 - 9.2 x10(3)/mcL    Mono # 0.43 0.1 - 1.3 x10(3)/mcL    Eos # 0.03 0 - 0.9 x10(3)/mcL    Baso # 0.02 <=0.2 x10(3)/mcL    IG# 0.04 0 - 0.04 x10(3)/mcL    IG% 0.4 %    NRBC% 0.0 %   CBC with Differential    Collection Time: 01/05/24  5:17 AM   Result Value Ref Range    WBC 9.56 4.50 - 11.50 x10(3)/mcL    RBC 3.63 (L) 4.20 - 5.40 x10(6)/mcL    Hgb 9.9 (L) 12.0 - 16.0 g/dL    Hct 31.1 (L) 37.0 - 47.0 %    MCV 85.7 80.0 - 94.0 fL    MCH 27.3 27.0 - 31.0 pg    MCHC 31.8 (L) 33.0 - 36.0 g/dL    RDW 12.1 11.5 - 17.0 %    Platelet 190 130 - 400 x10(3)/mcL    MPV 12.0 (H) 7.4 - 10.4 fL    Neut % 75.3 %    Lymph % 18.6 %    Mono % 4.8 %    Eos % 0.5 %    Basophil % 0.3 %    Lymph # 1.78 0.6 - 4.6 x10(3)/mcL    Neut # 7.19 2.1 - 9.2 x10(3)/mcL    Mono # 0.46 0.1 - 1.3 x10(3)/mcL    Eos # 0.05 0 - 0.9 x10(3)/mcL    Baso # 0.03 <=0.2 x10(3)/mcL    IG# 0.05 (H) 0 - 0.04 x10(3)/mcL    IG% 0.5 %    NRBC% 0.0 %        No image results found.      Plan    POD1, s/p repeat LTCS with BTL with:    Gestational hypertension with severe features  Blood pressures reviewed.  She had some elevations overnight to 180s systolic.  We will adjust labetalol to 200 mg q.8 hours.    On 1/4/2024, preeclampsia labs were reassuring.    Continue magnesium sulfate for 24 hours postpartum.    She was given instructions to report immediately if she has any headaches, vision problems, or epigastric pain.       Type 2 Diabetes mellitus  Will plan to check CBG 4 times daily.  We will continue  diabetic diet and will need to restart metformin at some point in time in the next couple of days.      Increased BMI  Importance of working on losing weight after the pregnancy was discussed. Breastfeeding may be an important tool in  reducing the postpartum weight retention.       VTE prophylaxis  Discussed risks of thrombus with prolonged bedrest. Discussed risks/benefits of SCD use vs frequent ROM and exercise of BLE while in bed to decrease risk of DVT with or without Jeremiah stocking.  Agreed to use SCDs.  Advised to immediately report BLE discomfort, swelling, hotness tenderness. Expectant management    Risks, benefits, alternatives and possible complications have been discussed in detail with the patient.  Admission, and post admission procedures and expectations were discussed in detail.  All questions answered.      Patient was evaluated around 8:00 a.m..  She had no acute complaints.  She was on magnesium sulfate for seizure prophylaxis.  We will monitor the sugars and probably restart metformin and give insulin if needed.  Adjusted labetalol as above.      This note was created with the assistance of [employer] voice recognition software. There may be transcription errors as a result of using this technology, however minimal. Effort has been made to assure accuracy of transcription, but any obvious errors or omissions should be clarified with the author of the document.        Patient was evaluated and examined by Dr. Mac. KARLEE Farr, helped as  scribe in completion of part of note.

## 2024-01-05 NOTE — ANESTHESIA POSTPROCEDURE EVALUATION
Anesthesia Post Evaluation    Patient: Ingrid Titus    Procedure(s) Performed: Procedure(s) (LRB):   SECTION, WITH TUBAL LIGATION (N/A)    Final Anesthesia Type: spinal      Patient location during evaluation: labor & delivery  Patient participation: Yes- Able to Participate  Level of consciousness: awake and alert  Post-procedure vital signs: reviewed and stable  Pain management: adequate  Airway patency: patent  SAIDA mitigation strategies: Multimodal analgesia  PONV status at discharge: No PONV  Anesthetic complications: no      Cardiovascular status: blood pressure returned to baseline and hemodynamically stable  Respiratory status: unassisted and room air  Hydration status: euvolemic  Follow-up not needed.          Vitals Value Taken Time   /79 24 1731   Temp 37 °C (98.6 °F) 24 1731   Pulse 75 24 1731   Resp 18 24 1731   SpO2 97 % 24 1731         No case tracking events are documented in the log.      Pain/Too Score: No data recorded

## 2024-01-05 NOTE — PROGRESS NOTES
PostPartum Progress Note        Subjective:      Postpartum Day #1 after LTCS  .  Patient is without complaints. Lochia decreasing, less than menses.  Pain is well controlled.  Passing flatus. Tolerating regular diet.    Has not ambulated - on Mg    Objective:      Temp:  [97.4 °F (36.3 °C)-98.6 °F (37 °C)] 97.5 °F (36.4 °C)  Pulse:  [] 89  Resp:  [12-18] 18  SpO2:  [90 %-100 %] 97 %  BP: (120-188)/(58-87) 128/66    Intake/Output Summary (Last 24 hours) at 2024 1035  Last data filed at 2024 1023  Gross per 24 hour   Intake 1100 ml   Output 3790 ml   Net -2690 ml     There is no height or weight on file to calculate BMI.    General: no acute distress  Abdomen: soft, appropriately tender,LTCS incision with bandage in place  Extremities: non-tender, symmetric    Group & Rh   Date Value Ref Range Status   2024 A POS  Final     Recent Results (from the past 336 hour(s))   CBC with Differential    Collection Time: 24  5:17 AM   Result Value Ref Range    WBC 9.56 4.50 - 11.50 x10(3)/mcL    Hgb 9.9 (L) 12.0 - 16.0 g/dL    Hct 31.1 (L) 37.0 - 47.0 %    Platelet 190 130 - 400 x10(3)/mcL   CBC with Differential    Collection Time: 24  7:02 PM   Result Value Ref Range    WBC 9.84 4.50 - 11.50 x10(3)/mcL    Hgb 11.8 (L) 12.0 - 16.0 g/dL    Hct 36.8 (L) 37.0 - 47.0 %    Platelet 197 130 - 400 x10(3)/mcL   CBC with Differential    Collection Time: 24 12:36 PM   Result Value Ref Range    WBC 6.89 4.50 - 11.50 x10(3)/mcL    Hgb 10.5 (L) 12.0 - 16.0 g/dL    Hct 32.7 (L) 37.0 - 47.0 %    Platelet 171 130 - 400 x10(3)/mcL          Assessment/Plan     33 y.o.  S/P , PPD # 1 - Doing appropriately   -Continue routine postpartum care  -Anticipated d/c POD#3-4    Active Problem List with Overview Notes    Diagnosis Date Noted    Anemia during pregnancy in third trimester 2023    Gestational hypertension, third trimester 2023    Excessive weight gain during pregnancy in third  trimester 12/14/2023    Supervision of elderly multigravida in third trimester 09/21/2023    Pre-existing type 2 diabetes mellitus in pregnancy in third trimester 07/27/2023    Increased BMI complicating pregnancy in third trimester 07/27/2023    At high risk for preeclampsia complications of intrauterine pregnancy (IUP) 07/27/2023           Caleb Calzada MD  01/05/2024 10:35 AM

## 2024-01-05 NOTE — PROGRESS NOTES
CM consulted due to routine NICU consult. Mom still recovering and receiving medical intervention on antepartum. Mom appeared drowsy and had multiple family members present at bedside. CM made introductions and explained CM role in infant's NICU stay. Mom reported that she is familiar with NICU due to delivering her oldest child at 33 weeks. Mom was agreeable with completing full CM assessment at a later date due to her needing some rest and recovery. Provided Mom with NICU support resource packet including CM contact and information regarding safe sleep practices, car seat safety and PPD/PPA. CM will complete assessment with Mom and continue to follow family throughout infant's NICU stay.

## 2024-01-05 NOTE — OP NOTE
[facility]                       [address] [place] [address]    PATIENT NAME:      SABRINA TITUS  YOB: 1990  CSN:               [id number]  MRN:               [id number]  ADMIT DATE:        2024 11:46:00  PHYSICIAN:         Kike Cote MD                          OPERATIVE REPORT      DATE OF SURGERY:        SURGEON:  Kike Cote MD    PREOPERATIVE DIAGNOSES:  Ms. Titus is a 33-year-old multigravida female, who   came in to [facility] at 35 weeks and 2 days   with severe preeclampsia, also multiparity, desire for postop sterility.    POSTOPERATIVE DIAGNOSES:  Ms. Titus is a 33-year-old multigravida female, who   came in to [facility] from [facility] at 35 weeks and 2 days   with severe preeclampsia, also multiparity, desire for postop sterility.    OPERATION:  Repeat low transverse  section and bilateral tubal ligation,   Anjali fashion.    ASSISTANTS:    1. Waqar Trevino MD.  2. Dr. Fontenot, U residency, PGY-2.    ANESTHESIOLOGIST:  Amos.    ANESTHESIA:  Spinal.    BLOOD LOSS:  Average.    FINDINGS:  Viable female infant, Apgars and weight are pending from NICU.    Normal uterus.  Normal adnexa bilaterally.  Freely mobile contents.    ANTIBIOTICS:  Ancef.    SCDs were placed.    DRAIN:  Chaudhry catheter, clear urine.    FLUIDS:  Crystalloid.    DESCRIPTION OF PROCEDURE:  The risks, benefits, and alternatives to this   procedure were explained in detail to Ms. Titus.  She verbalized understanding.    Consents were signed. She was taken to the operating room with intravenous   fluids running, placed on the operating table in a dorsal supine position.    Spinal anesthesia achieved without difficulty.  She was prepped and draped in   usual sterile fashion.  Pfannenstiel skin incision was made 2 fingerbreadths   above  symphysis pubis to an old scar line, carried down to underlying abdominal   cavity and extended bilaterally.  Danilo had been placed without difficulty.    Transverse uterine incision made, carried down into uterine cavity.  It was   extended bilaterally with manual dissection.  Infant's head delivered.  Nose and   mouth suctioned.  Rest of baby delivered without difficulty.  Cord doubly   clamped and cut.  Baby handed to waiting pediatric team.  Cord blood collected.    Placenta removed manually.  Uterus was cleared of all clots and debris and   delivered.  It was repaired with normal chromic suture in running locked   fashion.  Hemostasis noted.  Left tube grasped with Lisa clamp, Anjali tubal   performed with 2-0 plain gut x2.  Hemostasis noted.  Tube lumen fulgurated.  In   similar fashion, right tube removed.  Posterior cul-de-sac cleared of all clots   and debris.  Uterus, tubes, and ovaries returned to abdominal cavity.  Uterine   incision noted to be hemostatic.  Both tube sites noted to be hemostatic.    Danilo removed.  Parietal peritoneum and rectus muscle reapproximated with 2-0   Vicryl suture in running fashion.  Fascia reapproximated with #1 Vicryl suture   in running fashion.  Subcu irrigated, reapproximated with 2-0 plain gut suture.    Skin reapproximated with Insorb subcuticular staple device, followed by   Dermabond glue.  Sponge, lap, instrument, and needle counts correct x2.  The   patient was cleaned, brought to recovery room awake and in stable condition,   where she will be started on Mag sulfate therapy for 24 hours.        ______________________________  MD JAVIER Sherman/SYLVIE  DD:  01/04/2024  Time:  05:11PM  DT:  01/04/2024  Time:  08:10PM  Job #:  [id number]      OPERATIVE REPORT

## 2024-01-06 LAB
GLUCOSE BLD-MCNC: 103 MG/DL
POCT GLUCOSE: 103 MG/DL (ref 70–110)
POCT GLUCOSE: 84

## 2024-01-06 PROCEDURE — 11000001 HC ACUTE MED/SURG PRIVATE ROOM

## 2024-01-06 PROCEDURE — 25000003 PHARM REV CODE 250: Performed by: STUDENT IN AN ORGANIZED HEALTH CARE EDUCATION/TRAINING PROGRAM

## 2024-01-06 PROCEDURE — 25000003 PHARM REV CODE 250: Performed by: OBSTETRICS & GYNECOLOGY

## 2024-01-06 RX ORDER — IBUPROFEN 600 MG/1
600 TABLET ORAL EVERY 6 HOURS
Status: DISCONTINUED | OUTPATIENT
Start: 2024-01-06 | End: 2024-01-08 | Stop reason: HOSPADM

## 2024-01-06 RX ORDER — LABETALOL 100 MG/1
100 TABLET, FILM COATED ORAL ONCE
Status: COMPLETED | OUTPATIENT
Start: 2024-01-06 | End: 2024-01-06

## 2024-01-06 RX ORDER — METFORMIN HYDROCHLORIDE 500 MG/1
1000 TABLET ORAL 2 TIMES DAILY WITH MEALS
Status: DISCONTINUED | OUTPATIENT
Start: 2024-01-06 | End: 2024-01-08 | Stop reason: HOSPADM

## 2024-01-06 RX ADMIN — LABETALOL HYDROCHLORIDE 300 MG: 100 TABLET, FILM COATED ORAL at 02:01

## 2024-01-06 RX ADMIN — OXYCODONE AND ACETAMINOPHEN 1 TABLET: 10; 325 TABLET ORAL at 10:01

## 2024-01-06 RX ADMIN — OXYCODONE AND ACETAMINOPHEN 1 TABLET: 10; 325 TABLET ORAL at 03:01

## 2024-01-06 RX ADMIN — OXYCODONE AND ACETAMINOPHEN 1 TABLET: 10; 325 TABLET ORAL at 05:01

## 2024-01-06 RX ADMIN — SIMETHICONE 80 MG: 80 TABLET, CHEWABLE ORAL at 10:01

## 2024-01-06 RX ADMIN — LABETALOL HYDROCHLORIDE 100 MG: 100 TABLET, FILM COATED ORAL at 11:01

## 2024-01-06 RX ADMIN — OXYCODONE AND ACETAMINOPHEN 1 TABLET: 10; 325 TABLET ORAL at 07:01

## 2024-01-06 RX ADMIN — OXYCODONE AND ACETAMINOPHEN 1 TABLET: 10; 325 TABLET ORAL at 12:01

## 2024-01-06 RX ADMIN — DOCUSATE SODIUM 200 MG: 100 CAPSULE, LIQUID FILLED ORAL at 07:01

## 2024-01-06 RX ADMIN — LABETALOL HYDROCHLORIDE 200 MG: 200 TABLET, FILM COATED ORAL at 06:01

## 2024-01-06 RX ADMIN — METFORMIN HYDROCHLORIDE 1000 MG: 500 TABLET, FILM COATED ORAL at 06:01

## 2024-01-06 RX ADMIN — IBUPROFEN 600 MG: 600 TABLET, FILM COATED ORAL at 05:01

## 2024-01-06 RX ADMIN — LABETALOL HYDROCHLORIDE 300 MG: 100 TABLET, FILM COATED ORAL at 10:01

## 2024-01-06 RX ADMIN — IBUPROFEN 600 MG: 600 TABLET, FILM COATED ORAL at 10:01

## 2024-01-06 NOTE — NURSING
Messaged Dr. Calzada and told him pt's blood sugar was 189 1 hour after eating breakfast. He stated that to d/c Dr. Mac orders and only check blood sugar before meals.    @ 1300 spoke with  about restarting pt on her Metformin and Jardiance. Dr. Calzada said pt can restart Metformin but will need to follow up with PCP before restarting Jardiance.

## 2024-01-06 NOTE — PLAN OF CARE
Problem: Adult Inpatient Plan of Care  Goal: Plan of Care Review  Outcome: Ongoing, Progressing  Goal: Patient-Specific Goal (Individualized)  Outcome: Ongoing, Progressing  Goal: Absence of Hospital-Acquired Illness or Injury  Outcome: Ongoing, Progressing  Goal: Optimal Comfort and Wellbeing  Outcome: Ongoing, Progressing  Goal: Readiness for Transition of Care  Outcome: Ongoing, Progressing     Problem: Diabetes Comorbidity  Goal: Blood Glucose Level Within Targeted Range  Outcome: Ongoing, Progressing     Problem:  Fall Injury Risk  Goal: Absence of Fall, Infant Drop and Related Injury  Outcome: Ongoing, Progressing     Problem: Infection  Goal: Absence of Infection Signs and Symptoms  Outcome: Ongoing, Progressing

## 2024-01-06 NOTE — PROGRESS NOTES
PostPartum Progress Note        Subjective:      Postpartum Day #2 after LTCS  .  Patient is without complaints. Lochia decreasing, less than menses.  Pain is well controlled.  Passing flatus. Tolerating regular diet.    Ambulated without problems    Objective:      Temp:  [97.6 °F (36.4 °C)-98.9 °F (37.2 °C)] 98.9 °F (37.2 °C)  Pulse:  [] 95  Resp:  [16-18] 18  SpO2:  [87 %-100 %] 99 %  BP: (114-158)/(56-90) 158/90    Intake/Output Summary (Last 24 hours) at 2024 1008  Last data filed at 2024 1736  Gross per 24 hour   Intake --   Output 5160 ml   Net -5160 ml     There is no height or weight on file to calculate BMI.    General: no acute distress  Abdomen: soft, appropriately tender,LTCS incision c/d/I   Extremities: non-tender, symmetric    Group & Rh   Date Value Ref Range Status   2024 A POS  Final     Recent Results (from the past 336 hour(s))   CBC with Differential    Collection Time: 24  5:17 AM   Result Value Ref Range    WBC 9.56 4.50 - 11.50 x10(3)/mcL    Hgb 9.9 (L) 12.0 - 16.0 g/dL    Hct 31.1 (L) 37.0 - 47.0 %    Platelet 190 130 - 400 x10(3)/mcL   CBC with Differential    Collection Time: 24  7:02 PM   Result Value Ref Range    WBC 9.84 4.50 - 11.50 x10(3)/mcL    Hgb 11.8 (L) 12.0 - 16.0 g/dL    Hct 36.8 (L) 37.0 - 47.0 %    Platelet 197 130 - 400 x10(3)/mcL   CBC with Differential    Collection Time: 24 12:36 PM   Result Value Ref Range    WBC 6.89 4.50 - 11.50 x10(3)/mcL    Hgb 10.5 (L) 12.0 - 16.0 g/dL    Hct 32.7 (L) 37.0 - 47.0 %    Platelet 171 130 - 400 x10(3)/mcL          Assessment/Plan     33 y.o.  S/P , PPD # 2 - Doing appropriately   -Continue routine postpartum care  -Anticipated d/c POD#3-4    Active Problem List with Overview Notes    Diagnosis Date Noted    Anemia during pregnancy in third trimester 2023    Gestational hypertension, third trimester 2023    Excessive weight gain during pregnancy in third trimester  12/14/2023    Supervision of elderly multigravida in third trimester 09/21/2023    Pre-existing type 2 diabetes mellitus in pregnancy in third trimester 07/27/2023    Increased BMI complicating pregnancy in third trimester 07/27/2023    At high risk for preeclampsia complications of intrauterine pregnancy (IUP) 07/27/2023       HTN  -increase lab 200 TID to 300 TID    Caleb Calzada MD  01/06/2024 10:35 AM

## 2024-01-07 LAB
POCT GLUCOSE: 84 MG/DL (ref 70–110)
POCT GLUCOSE: 84 MG/DL (ref 70–110)
POCT GLUCOSE: 88 MG/DL (ref 70–110)
POCT GLUCOSE: 97 MG/DL (ref 70–110)

## 2024-01-07 PROCEDURE — 25000003 PHARM REV CODE 250: Performed by: STUDENT IN AN ORGANIZED HEALTH CARE EDUCATION/TRAINING PROGRAM

## 2024-01-07 PROCEDURE — 25000003 PHARM REV CODE 250: Performed by: OBSTETRICS & GYNECOLOGY

## 2024-01-07 PROCEDURE — 11000001 HC ACUTE MED/SURG PRIVATE ROOM

## 2024-01-07 RX ORDER — HYDRALAZINE HYDROCHLORIDE 20 MG/ML
10 INJECTION INTRAMUSCULAR; INTRAVENOUS ONCE AS NEEDED
Status: DISCONTINUED | OUTPATIENT
Start: 2024-01-07 | End: 2024-01-08 | Stop reason: HOSPADM

## 2024-01-07 RX ORDER — LABETALOL HCL 20 MG/4 ML
40 SYRINGE (ML) INTRAVENOUS ONCE AS NEEDED
Status: DISCONTINUED | OUTPATIENT
Start: 2024-01-07 | End: 2024-01-08 | Stop reason: HOSPADM

## 2024-01-07 RX ORDER — LABETALOL HCL 20 MG/4 ML
20 SYRINGE (ML) INTRAVENOUS ONCE AS NEEDED
Status: DISCONTINUED | OUTPATIENT
Start: 2024-01-07 | End: 2024-01-08 | Stop reason: HOSPADM

## 2024-01-07 RX ORDER — LABETALOL 200 MG/1
600 TABLET, FILM COATED ORAL EVERY 8 HOURS
Status: DISCONTINUED | OUTPATIENT
Start: 2024-01-07 | End: 2024-01-08 | Stop reason: HOSPADM

## 2024-01-07 RX ORDER — LABETALOL HCL 20 MG/4 ML
80 SYRINGE (ML) INTRAVENOUS ONCE AS NEEDED
Status: DISCONTINUED | OUTPATIENT
Start: 2024-01-07 | End: 2024-01-08 | Stop reason: HOSPADM

## 2024-01-07 RX ADMIN — OXYCODONE AND ACETAMINOPHEN 1 TABLET: 10; 325 TABLET ORAL at 12:01

## 2024-01-07 RX ADMIN — OXYCODONE AND ACETAMINOPHEN 1 TABLET: 10; 325 TABLET ORAL at 08:01

## 2024-01-07 RX ADMIN — IBUPROFEN 600 MG: 600 TABLET, FILM COATED ORAL at 12:01

## 2024-01-07 RX ADMIN — IBUPROFEN 600 MG: 600 TABLET, FILM COATED ORAL at 03:01

## 2024-01-07 RX ADMIN — OXYCODONE AND ACETAMINOPHEN 1 TABLET: 10; 325 TABLET ORAL at 04:01

## 2024-01-07 RX ADMIN — SIMETHICONE 80 MG: 80 TABLET, CHEWABLE ORAL at 09:01

## 2024-01-07 RX ADMIN — IBUPROFEN 600 MG: 600 TABLET, FILM COATED ORAL at 06:01

## 2024-01-07 RX ADMIN — METFORMIN HYDROCHLORIDE 1000 MG: 500 TABLET, FILM COATED ORAL at 08:01

## 2024-01-07 RX ADMIN — LABETALOL HYDROCHLORIDE 600 MG: 200 TABLET, FILM COATED ORAL at 06:01

## 2024-01-07 RX ADMIN — DOCUSATE SODIUM 200 MG: 100 CAPSULE, LIQUID FILLED ORAL at 08:01

## 2024-01-07 RX ADMIN — LABETALOL HYDROCHLORIDE 600 MG: 200 TABLET, FILM COATED ORAL at 10:01

## 2024-01-07 RX ADMIN — METFORMIN HYDROCHLORIDE 1000 MG: 500 TABLET, FILM COATED ORAL at 04:01

## 2024-01-07 RX ADMIN — LABETALOL HYDROCHLORIDE 600 MG: 200 TABLET, FILM COATED ORAL at 02:01

## 2024-01-07 NOTE — PROGRESS NOTES
PostPartum Progress Note        Subjective:      Postpartum Day #3 after LTCS  .  Patient is without complaints. Lochia decreasing, less than menses.  Pain is well controlled.  Passing flatus. Tolerating regular diet.    Ambulated without problems    Objective:      Temp:  [97.6 °F (36.4 °C)-98.9 °F (37.2 °C)] 98.1 °F (36.7 °C)  Pulse:  [75-95] 80  Resp:  [18] 18  SpO2:  [97 %-99 %] 98 %  BP: (137-169)/(78-94) 159/88  No intake or output data in the 24 hours ending 24 0747    There is no height or weight on file to calculate BMI.    General: no acute distress  Abdomen: soft, appropriately tender,LTCS incision c/d/I   Extremities: non-tender, symmetric    Group & Rh   Date Value Ref Range Status   2024 A POS  Final     Recent Results (from the past 336 hour(s))   CBC with Differential    Collection Time: 24  5:17 AM   Result Value Ref Range    WBC 9.56 4.50 - 11.50 x10(3)/mcL    Hgb 9.9 (L) 12.0 - 16.0 g/dL    Hct 31.1 (L) 37.0 - 47.0 %    Platelet 190 130 - 400 x10(3)/mcL   CBC with Differential    Collection Time: 24  7:02 PM   Result Value Ref Range    WBC 9.84 4.50 - 11.50 x10(3)/mcL    Hgb 11.8 (L) 12.0 - 16.0 g/dL    Hct 36.8 (L) 37.0 - 47.0 %    Platelet 197 130 - 400 x10(3)/mcL   CBC with Differential    Collection Time: 24 12:36 PM   Result Value Ref Range    WBC 6.89 4.50 - 11.50 x10(3)/mcL    Hgb 10.5 (L) 12.0 - 16.0 g/dL    Hct 32.7 (L) 37.0 - 47.0 %    Platelet 171 130 - 400 x10(3)/mcL          Assessment/Plan     33 y.o.  S/P , PPD # 3 - Doing appropriately   -Continue routine postpartum care  -Anticipated d/c POD#4    Active Problem List with Overview Notes    Diagnosis Date Noted    Anemia during pregnancy in third trimester 2023    Gestational hypertension, third trimester 2023    Excessive weight gain during pregnancy in third trimester 2023    Supervision of elderly multigravida in third trimester 2023    Pre-existing type 2  diabetes mellitus in pregnancy in third trimester 07/27/2023    Increased BMI complicating pregnancy in third trimester 07/27/2023    At high risk for preeclampsia complications of intrauterine pregnancy (IUP) 07/27/2023       HTN  -increase labetalol from 300 to  600 TID    Caleb Calzada MD  01/07/2024 10:35 AM

## 2024-01-07 NOTE — PLAN OF CARE
Problem: Adult Inpatient Plan of Care  Goal: Plan of Care Review  Outcome: Ongoing, Progressing  Goal: Patient-Specific Goal (Individualized)  Outcome: Ongoing, Progressing  Goal: Absence of Hospital-Acquired Illness or Injury  Outcome: Ongoing, Progressing  Goal: Optimal Comfort and Wellbeing  Outcome: Ongoing, Progressing  Goal: Readiness for Transition of Care  Outcome: Ongoing, Progressing     Problem: Diabetes Comorbidity  Goal: Blood Glucose Level Within Targeted Range  Outcome: Ongoing, Progressing     Problem:  Fall Injury Risk  Goal: Absence of Fall, Infant Drop and Related Injury  Outcome: Ongoing, Progressing     Problem: Infection  Goal: Absence of Infection Signs and Symptoms  Outcome: Ongoing, Progressing     Problem: Adjustment to Role Transition (Postpartum  Delivery)  Goal: Successful Maternal Role Transition  Outcome: Ongoing, Progressing     Problem: Bleeding (Postpartum  Delivery)  Goal: Hemostasis  Outcome: Ongoing, Progressing     Problem: Infection (Postpartum  Delivery)  Goal: Absence of Infection Signs and Symptoms  Outcome: Ongoing, Progressing     Problem: Pain (Postpartum  Delivery)  Goal: Acceptable Pain Control  Outcome: Ongoing, Progressing     Problem: Postoperative Nausea and Vomiting (Postpartum  Delivery)  Goal: Nausea and Vomiting Relief  Outcome: Ongoing, Progressing     Problem: Postoperative Urinary Retention (Postpartum  Delivery)  Goal: Effective Urinary Elimination  Outcome: Ongoing, Progressing

## 2024-01-08 VITALS
TEMPERATURE: 98 F | HEART RATE: 75 BPM | RESPIRATION RATE: 20 BRPM | OXYGEN SATURATION: 98 % | DIASTOLIC BLOOD PRESSURE: 87 MMHG | SYSTOLIC BLOOD PRESSURE: 152 MMHG

## 2024-01-08 DIAGNOSIS — O24.113 PRE-EXISTING TYPE 2 DIABETES MELLITUS IN PREGNANCY IN THIRD TRIMESTER: Primary | ICD-10-CM

## 2024-01-08 PROBLEM — I10 HYPERTENSION: Status: ACTIVE | Noted: 2024-01-08

## 2024-01-08 LAB — PSYCHE PATHOLOGY RESULT: NORMAL

## 2024-01-08 PROCEDURE — 25000003 PHARM REV CODE 250: Performed by: STUDENT IN AN ORGANIZED HEALTH CARE EDUCATION/TRAINING PROGRAM

## 2024-01-08 PROCEDURE — 25000003 PHARM REV CODE 250: Performed by: OBSTETRICS & GYNECOLOGY

## 2024-01-08 RX ORDER — LABETALOL 300 MG/1
600 TABLET, FILM COATED ORAL EVERY 8 HOURS
Qty: 180 TABLET | Refills: 11 | Status: SHIPPED | OUTPATIENT
Start: 2024-01-08 | End: 2025-01-07

## 2024-01-08 RX ORDER — OXYCODONE AND ACETAMINOPHEN 10; 325 MG/1; MG/1
1 TABLET ORAL EVERY 6 HOURS PRN
Qty: 28 TABLET | Refills: 0 | Status: SHIPPED | OUTPATIENT
Start: 2024-01-08

## 2024-01-08 RX ADMIN — IBUPROFEN 600 MG: 600 TABLET, FILM COATED ORAL at 12:01

## 2024-01-08 RX ADMIN — OXYCODONE AND ACETAMINOPHEN 1 TABLET: 10; 325 TABLET ORAL at 12:01

## 2024-01-08 RX ADMIN — IBUPROFEN 600 MG: 600 TABLET, FILM COATED ORAL at 06:01

## 2024-01-08 RX ADMIN — OXYCODONE AND ACETAMINOPHEN 1 TABLET: 10; 325 TABLET ORAL at 08:01

## 2024-01-08 RX ADMIN — DOCUSATE SODIUM 200 MG: 100 CAPSULE, LIQUID FILLED ORAL at 08:01

## 2024-01-08 RX ADMIN — METFORMIN HYDROCHLORIDE 1000 MG: 500 TABLET, FILM COATED ORAL at 08:01

## 2024-01-08 RX ADMIN — LABETALOL HYDROCHLORIDE 600 MG: 200 TABLET, FILM COATED ORAL at 06:01

## 2024-01-08 NOTE — NURSING
Attempted to call GABRIELA Artis MD office to schedule patient's blood pressure check. After 5 attempts, they have been unable to be reached. Pt will be educated to call herself to make an appointment for 2-3 days from today to check blood pressure in office.

## 2024-01-08 NOTE — DISCHARGE SUMMARY
[facility]                       [address] [id number]    PATIENT NAME:       SABRINA BEY  YOB: 1990  CSN:                [id number]   MRN:                [id number]  ADMIT DATE:         2024 11:46:00  PHYSICIAN:          Js Cote Jr, MD                          DISCHARGE SUMMARY    DATE OF DISCHARGE:      HOSPITAL COURSE:  The patient is status post  section.  She underwent   the procedure without incident.  She was admitted to the postpartum GYN service,   where she had an uneventful and speedy recovery.  She is ambulating, tolerating   a regular diet, vitals are stable, and she is afebrile.  She had normal bowel   and bladder function.  She will be discharged home on postpartum day #3.    CONDITION ON DISCHARGE:  Stable.    DIET:  Regular.    ACTIVITY:  Pelvic rest.    DISCHARGE MEDICATIONS:  Percocet, labetalol, and Motrin.    FOLLOWUP:  Follow up with Dr. Cote as scheduled.        ______________________________  Js Cote Jr, MD    DJE/AQS  DD:  2024  Time:  08:26AM  DT:  2024  Time:  09:57AM  Job #:  [id number]      DISCHARGE SUMMARY

## 2024-01-08 NOTE — PROGRESS NOTES
Admit Assessment    Patient Identification  Girl Ingrid Titus   :  2026  Admit Date:  2024  Attending Provider:  Lucas Guerrier MD              Referral:   Pt was admitted to NICU with a diagnosis of   infant of 35 completed weeks of gestation, and was admitted this hospital stay due to Respiratory distress of , unspecified [P22.9].   is involved was referred to the Social Work Department via Routine NICU consult.    I met with mom, Ingrid Titus, in her post-partum room. Mom presented appropriately and was agreeable to assessment at this time. OB: Js Cote. Pediatrician: Kari Schumacher. FOB: Andreas Titus Jr. [phone]. Mom reported having two other daughters, a 10 year old and a 7 year old. Mom reported that her oldest daughter was also born prematurely and spent 42 days in NICU. Mom wishes to breastfeed infant and has a pump at home as well as a car seat, a crib for safe sleep, WIC and SNAP benefits. Mom reported having reliable transportation for visitation and strong social support in her family. Mom reported a hx of anxiety which was treated with Rx medication prior to pregnancy. Provided education regarding PPD/PPA and encouraged Mom to seek medical intervention should she feel it is appropriate for any increased anxiety or depressive symptoms. Mom denied any hx of substance use. Mom reported feeling safe at home. Provided Mom with NICU support resource packet including information on safe sleep practices, car seat safety and PPD/PPA. Explained CM role in infant's NICU stay and Mom denied any further social or resource needs to be addressed at this time.  Baby girl, Paul Titus was born via  Delivery at 35 2/7 WGA weighing 6lb 6oz. Verified her face sheet information:      Living Situation:      Resides at [address] [place] [id number] Mailing address: [address] [facility] [facility] [address], phone: [phone] (home).              History/Current Symptoms of Anxiety/Depression:   Discussed PPD and identifying symptoms and provided mom with PPD counseling resources and symptom brochure.       Identified Support:  strong family support; primary supports are FOB and maternal grandmother     History/Current Substance Use: Mom denied      Indications of Abuse/Neglect:  No indications present at this time        Emotional/Behavioral/Cognitive Issues: Mom reported a hx of anxiety      Current RX Prescriptions: Mom is being discharged with Rx labetalol     Adequate Discharge/Visiting Transportation: yes      ARTUR Signed/Filed: no     Qualifies:   Early steps: no  SSI: no     NICU Assessment completed in Flowsheets:        01/08/24 1137   NICU Assessment   Assessment Type Discharge Planning Assessment   Source of Information family   Verified Demographic and Insurance Information Yes   Insurance Medicaid   Medicaid [employer]   Lives With mother;father;sister   Name(s) of People in Home Cy Titus, mother and father   Number people in home 5 including infant   Relationship Status of Parents    Primary Source of Support/Comfort parent   Other children (include names and ages) 10 year old and 7 year old sisters   Currently Enrolled in School No   Father's Involvement Fully Involved   Is Father signing the birth certificate Yes   Father Currently Enrolled in School No   Family Involvement High   Infant Feeding Plan breastfeeding;expressed breast milk   Previous Breastfeeding Experience yes   Breast Pump Needed no   Does baby have crib or safe sleep space? Yes   Do you have a car seat? Yes   Resource/Environmental Concerns none   Environment Concerns none   Potential Discharge Needs None   Resources/Education Provided Support Resources for NICU Families;Post Partum Depression   DME Needed Upon Discharge  none   DCFS No indications (Indicators for Report)   Discharge Plan A Home with family   Do you have any problems  affording any of your prescribed medications? TBD        Plan:     Patient/caregiver engaged in treatment planning process.      providing psychosocial and supportive counseling, resources, education, assistance and discharge planning as appropriate.  Patient/caregiver state understanding of  available resources,  following, remains available.        Patient/Caregiver informed of right to choose providers or agencies.  Patient/Caregiver provides permission to release any necessary information to [facility] and to Non-[facility] agencies as needed to facilitate patient care, treatment planning, and patient discharge planning.  Written and verbal resources provided.

## 2024-01-08 NOTE — NURSING
Written discharge instructions were given and reviewed, including meds, follow up appointments, and precautions to take at home. Pt verbalized understanding. Pt was educated on need for blood pressure check in 2-3 days. Pt stated she will call GABRIELA Cote's office to schedule.

## 2024-01-09 LAB
POCT GLUCOSE: 101 MG/DL (ref 70–110)
POCT GLUCOSE: 189 MG/DL (ref 70–110)

## 2024-01-25 ENCOUNTER — PATIENT MESSAGE (OUTPATIENT)
Dept: MATERNAL FETAL MEDICINE | Facility: CLINIC | Age: 34
End: 2024-01-25
Payer: MEDICAID

## 2024-04-01 PROBLEM — O13.3 GESTATIONAL HYPERTENSION, THIRD TRIMESTER: Status: RESOLVED | Noted: 2023-12-14 | Resolved: 2024-04-01

## (undated) DEVICE — SYS CLSR DERMABOND PRINEO 22CM

## (undated) DEVICE — STAPLER SKIN SUBCUTICULAR

## (undated) DEVICE — ELECTRODE REM PLYHSV RETURN 9

## (undated) DEVICE — SUT GUT 2-0 54

## (undated) DEVICE — MATTRESS HOVERMAT TRNSF 34X78

## (undated) DEVICE — SOL NACL IRR 1000ML BTL